# Patient Record
Sex: MALE | Race: WHITE | NOT HISPANIC OR LATINO | Employment: OTHER | URBAN - NONMETROPOLITAN AREA
[De-identification: names, ages, dates, MRNs, and addresses within clinical notes are randomized per-mention and may not be internally consistent; named-entity substitution may affect disease eponyms.]

---

## 2017-06-02 DIAGNOSIS — Z12.5 SCREENING FOR MALIGNANT NEOPLASM OF PROSTATE: ICD-10-CM

## 2017-06-02 DIAGNOSIS — E78.5 HYPERLIPIDEMIA, UNSPECIFIED HYPERLIPIDEMIA TYPE: Primary | ICD-10-CM

## 2017-06-02 LAB
ALBUMIN SERPL-MCNC: 4.1 G/DL (ref 3.2–5.5)
ALBUMIN/GLOB SERPL: 1.4 G/DL (ref 1–3)
ALP SERPL-CCNC: 69 U/L (ref 15–121)
ALT SERPL W P-5'-P-CCNC: 14 U/L (ref 10–60)
ANION GAP SERPL CALCULATED.3IONS-SCNC: 10 MMOL/L (ref 5–15)
AST SERPL-CCNC: 18 U/L (ref 10–60)
BASOPHILS # BLD AUTO: 0.04 10*3/MM3 (ref 0–0.2)
BASOPHILS NFR BLD AUTO: 0.7 % (ref 0–2)
BILIRUB SERPL-MCNC: 1.2 MG/DL (ref 0.2–1)
BUN BLD-MCNC: 24 MG/DL (ref 8–25)
BUN/CREAT SERPL: 30 (ref 7–25)
CALCIUM SPEC-SCNC: 9.2 MG/DL (ref 8.4–10.8)
CHLORIDE SERPL-SCNC: 107 MMOL/L (ref 100–112)
CHOLEST SERPL-MCNC: 211 MG/DL (ref 150–200)
CO2 SERPL-SCNC: 25 MMOL/L (ref 20–32)
CREAT BLD-MCNC: 0.8 MG/DL (ref 0.4–1.3)
DEPRECATED RDW RBC AUTO: 42.1 FL (ref 35.1–43.9)
EOSINOPHIL # BLD AUTO: 0.18 10*3/MM3 (ref 0–0.7)
EOSINOPHIL NFR BLD AUTO: 3.2 % (ref 0–7)
ERYTHROCYTE [DISTWIDTH] IN BLOOD BY AUTOMATED COUNT: 13.1 % (ref 11.5–14.5)
GFR SERPL CREATININE-BSD FRML MDRD: 96 ML/MIN/1.73 (ref 49–113)
GLOBULIN UR ELPH-MCNC: 2.9 GM/DL (ref 2.5–4.6)
GLUCOSE BLD-MCNC: 103 MG/DL (ref 70–100)
HCT VFR BLD AUTO: 45 % (ref 39–49)
HDLC SERPL-MCNC: 64 MG/DL (ref 35–100)
HGB BLD-MCNC: 15.5 G/DL (ref 13.7–17.3)
LDLC SERPL CALC-MCNC: 132 MG/DL
LDLC/HDLC SERPL: 2.07 {RATIO}
LYMPHOCYTES # BLD AUTO: 1.57 10*3/MM3 (ref 0.6–4.2)
LYMPHOCYTES NFR BLD AUTO: 27.6 % (ref 10–50)
MCH RBC QN AUTO: 30.8 PG (ref 26.5–34)
MCHC RBC AUTO-ENTMCNC: 34.4 G/DL (ref 31.5–36.3)
MCV RBC AUTO: 89.3 FL (ref 80–98)
MONOCYTES # BLD AUTO: 0.47 10*3/MM3 (ref 0–0.9)
MONOCYTES NFR BLD AUTO: 8.3 % (ref 0–12)
NEUTROPHILS # BLD AUTO: 3.43 10*3/MM3 (ref 2–8.6)
NEUTROPHILS NFR BLD AUTO: 60.2 % (ref 37–80)
PLATELET # BLD AUTO: 268 10*3/MM3 (ref 150–450)
PMV BLD AUTO: 9.1 FL (ref 8–12)
POTASSIUM BLD-SCNC: 4 MMOL/L (ref 3.4–5.4)
PROT SERPL-MCNC: 7 G/DL (ref 6.7–8.2)
PSA SERPL-MCNC: 4.4 NG/ML (ref 0–4)
RBC # BLD AUTO: 5.04 10*6/MM3 (ref 4.37–5.74)
SODIUM BLD-SCNC: 142 MMOL/L (ref 134–146)
TRIGL SERPL-MCNC: 73 MG/DL (ref 35–160)
TSH SERPL DL<=0.05 MIU/L-ACNC: 0.92 MIU/ML (ref 0.46–4.68)
VLDLC SERPL-MCNC: 14.6 MG/DL
WBC NRBC COR # BLD: 5.69 10*3/MM3 (ref 3.2–9.8)

## 2017-06-02 PROCEDURE — 84153 ASSAY OF PSA TOTAL: CPT | Performed by: INTERNAL MEDICINE

## 2017-06-02 PROCEDURE — 84443 ASSAY THYROID STIM HORMONE: CPT | Performed by: INTERNAL MEDICINE

## 2017-06-02 PROCEDURE — 36415 COLL VENOUS BLD VENIPUNCTURE: CPT | Performed by: INTERNAL MEDICINE

## 2017-06-02 PROCEDURE — 80053 COMPREHEN METABOLIC PANEL: CPT | Performed by: INTERNAL MEDICINE

## 2017-06-02 PROCEDURE — 80061 LIPID PANEL: CPT | Performed by: INTERNAL MEDICINE

## 2017-06-02 PROCEDURE — 85025 COMPLETE CBC W/AUTO DIFF WBC: CPT | Performed by: INTERNAL MEDICINE

## 2017-06-09 ENCOUNTER — OFFICE VISIT (OUTPATIENT)
Dept: FAMILY MEDICINE CLINIC | Facility: CLINIC | Age: 67
End: 2017-06-09

## 2017-06-09 VITALS
WEIGHT: 192 LBS | SYSTOLIC BLOOD PRESSURE: 130 MMHG | HEIGHT: 73 IN | HEART RATE: 60 BPM | TEMPERATURE: 98.2 F | BODY MASS INDEX: 25.45 KG/M2 | DIASTOLIC BLOOD PRESSURE: 80 MMHG

## 2017-06-09 DIAGNOSIS — E78.01 FAMILIAL HYPERCHOLESTEROLEMIA: Primary | Chronic | ICD-10-CM

## 2017-06-09 DIAGNOSIS — R97.20 ELEVATED PROSTATE SPECIFIC ANTIGEN (PSA): Chronic | ICD-10-CM

## 2017-06-09 DIAGNOSIS — Z12.5 SCREENING FOR MALIGNANT NEOPLASM OF PROSTATE: Chronic | ICD-10-CM

## 2017-06-09 DIAGNOSIS — M75.101 ROTATOR CUFF SYNDROME OF RIGHT SHOULDER: ICD-10-CM

## 2017-06-09 PROCEDURE — 99214 OFFICE O/P EST MOD 30 MIN: CPT | Performed by: INTERNAL MEDICINE

## 2017-06-09 RX ORDER — ACETAMINOPHEN,DIPHENHYDRAMINE HCL 500; 25 MG/1; MG/1
1 TABLET, FILM COATED ORAL NIGHTLY PRN
COMMUNITY
End: 2017-06-09

## 2017-06-09 NOTE — PROGRESS NOTES
Subjective     History of Present Illness     Arias Rodriguez is a 66 y.o. male here for overdue annual follow up and review of labs.  His medical issues include hyperlipidemia, osteoarthritis, and diverticular disease.  He continues to take Naproxen for flares of osteoarthritic pain, especially in the right knee.       Dr. Isaac is addressing right rotator cuff injury and recommended a second opinion by Dr. Lira, orthopedist regarding surgical repair.  He injured his shoulder in a fall in March.   He was asking about other available orthopedist.  I gave him the names of Dr. Clinton and Dr. Acevedo.  He has taken a Tylenol PM at night to help him sleep due to the shoulder pain.  I recommended he stop the Tylenol PM once his shoulder pain is resolved.       GERD symptoms are adequately controlled.      He has a history of enlarged prostate.  He denies significant BPH symptoms currently.   PSA steady at 4.4.      Weight is stable.     The patient's relevant past medical, surgical, and social history was reviewed in Epic.  Lab results are reviewed with the patient today.  CBC unremarkable. Fasting glucose 103.  Liver and renal function normal.  Thyroid at goal.  Total cholesterol improved at 211.  HDL 64 and .  Triglycerides 73.  PSA is steady at 4.4.       Review of Systems   Constitutional: Negative for chills, fatigue and fever.   HENT: Negative for congestion, ear pain, postnasal drip, sinus pressure and sore throat.    Respiratory: Negative for cough, shortness of breath and wheezing.    Cardiovascular: Negative for chest pain, palpitations and leg swelling.   Gastrointestinal: Negative for abdominal pain, blood in stool, constipation, diarrhea, nausea and vomiting.   Endocrine: Negative for cold intolerance, heat intolerance, polydipsia and polyuria.   Genitourinary: Negative for dysuria, frequency, hematuria and urgency.   Skin: Negative for rash.   Neurological: Negative for syncope and  "weakness.      PHQ-9 Depression Screening 6/9/2017   Little interest or pleasure in doing things 0   Feeling down, depressed, or hopeless 0   Trouble falling or staying asleep, or sleeping too much 0   Feeling tired or having little energy 1   Poor appetite or overeating 0   Feeling bad about yourself - or that you are a failure or have let yourself or your family down 0   Trouble concentrating on things, such as reading the newspaper or watching television 0   Moving or speaking so slowly that other people could have noticed. Or the opposite - being so fidgety or restless that you have been moving around a lot more than usual 0   Thoughts that you would be better off dead, or of hurting yourself in some way 0   PHQ-9 Total Score 1   If you checked off any problems, how difficult have these problems made it for you to do your work, take care of things at home, or get along with other people? Somewhat difficult         Objective     Vitals:    06/09/17 0953   BP: 130/80   Pulse: 60   Temp: 98.2 °F (36.8 °C)   TempSrc: Oral   Weight: 192 lb (87.1 kg)   Height: 73\" (185.4 cm)       Physical Exam   Constitutional: He is oriented to person, place, and time. He appears well-developed and well-nourished. No distress.   HENT:   Head: Normocephalic and atraumatic.   Nose: Right sinus exhibits no maxillary sinus tenderness and no frontal sinus tenderness. Left sinus exhibits no maxillary sinus tenderness and no frontal sinus tenderness.   Mouth/Throat: Uvula is midline, oropharynx is clear and moist and mucous membranes are normal. No oral lesions. No tonsillar exudate.   Eyes: Conjunctivae and EOM are normal. Pupils are equal, round, and reactive to light.   Neck: Trachea normal. Neck supple. No JVD present. Carotid bruit is not present. No tracheal deviation present. No thyroid mass and no thyromegaly present.   Cardiovascular: Normal rate, regular rhythm and normal heart sounds.   No extrasystoles are present. PMI is not " displaced.    No murmur heard.  Pulmonary/Chest: Effort normal and breath sounds normal. No accessory muscle usage. No respiratory distress. He has no decreased breath sounds. He has no wheezes. He has no rhonchi. He has no rales.   Abdominal: Soft. Bowel sounds are normal. He exhibits no distension. There is no hepatosplenomegaly. There is no tenderness.       Vascular Status -  His exam exhibits right foot vasculature normal. His exam exhibits no right foot edema. His exam exhibits left foot vasculature normal. His exam exhibits no left foot edema.  Lymphadenopathy:     He has no cervical adenopathy.   Neurological: He is alert and oriented to person, place, and time. No cranial nerve deficit. Coordination normal.   Skin: Skin is warm, dry and intact. No rash noted. No cyanosis. Nails show no clubbing.   Psychiatric: He has a normal mood and affect. His speech is normal and behavior is normal. Thought content normal.   Vitals reviewed.    Future Appointments  Date Time Provider Department Center   6/11/2018 10:00 AM Peyman Denney MD [facility] None       Assessment/Plan      Keep his appointment with Dr. Lira for the right rotator cuff injury, As arranged by Dr. Isaac.  He will notify us if he wants a referral to another orthopedist.  I gave him names of Dr. Acevedo and Dr. Martinez today when he asked who I normally recommend for shoulder surgeries.         PSA is remaining stable and he reports no significant BPH symptoms.  We will repeat a prostate exam next year.    Continue dietary efforts to control cholesterol.  His ratio is quite reasonable.    Continue to follow the PSA.  This year's results are still only approximately 50% of the elevated PSA a couple years ago.    Scribed for Dr. Denney by Edda Costello [facility].      Diagnoses and all orders for this visit:    Familial hypercholesterolemia  -     CBC Auto Differential; Future  -     Comprehensive Metabolic Panel; Future  -     Lipid Panel;  Future    Elevated prostate specific antigen (PSA)    Rotator cuff syndrome of right shoulder    Screening for malignant neoplasm of prostate  -     PSA Screen; Future    Other orders  -     Discontinue: diphenhydrAMINE-acetaminophen (TYLENOL PM)  MG tablet per tablet; Take 1 tablet by mouth At Night As Needed for Sleep.      Orders Only on 06/02/2017   Component Date Value Ref Range Status   • Glucose 06/02/2017 103* 70 - 100 mg/dL Final   • BUN 06/02/2017 24  8 - 25 mg/dL Final   • Creatinine 06/02/2017 0.80  0.40 - 1.30 mg/dL Final   • Sodium 06/02/2017 142  134 - 146 mmol/L Final   • Potassium 06/02/2017 4.0  3.4 - 5.4 mmol/L Final   • Chloride 06/02/2017 107  100 - 112 mmol/L Final   • CO2 06/02/2017 25.0  20.0 - 32.0 mmol/L Final   • Calcium 06/02/2017 9.2  8.4 - 10.8 mg/dL Final   • Total Protein 06/02/2017 7.0  6.7 - 8.2 g/dL Final   • Albumin 06/02/2017 4.10  3.20 - 5.50 g/dL Final   • ALT (SGPT) 06/02/2017 14  10 - 60 U/L Final   • AST (SGOT) 06/02/2017 18  10 - 60 U/L Final   • Alkaline Phosphatase 06/02/2017 69  15 - 121 U/L Final   • Total Bilirubin 06/02/2017 1.2* 0.2 - 1.0 mg/dL Final   • eGFR Non African Amer 06/02/2017 96  49 - 113 mL/min/1.73 Final   • Globulin 06/02/2017 2.9  2.5 - 4.6 gm/dL Final   • A/G Ratio 06/02/2017 1.4  1.0 - 3.0 g/dL Final   • BUN/Creatinine Ratio 06/02/2017 30.0* 7.0 - 25.0 Final   • Anion Gap 06/02/2017 10.0  5.0 - 15.0 mmol/L Final   • Total Cholesterol 06/02/2017 211* 150 - 200 mg/dL Final   • Triglycerides 06/02/2017 73  35 - 160 mg/dL Final   • HDL Cholesterol 06/02/2017 64  35 - 100 mg/dL Final   • LDL Cholesterol  06/02/2017 132  mg/dL Final   • VLDL Cholesterol 06/02/2017 14.6  mg/dL Final   • LDL/HDL Ratio 06/02/2017 2.07   Final   • TSH 06/02/2017 0.920  0.460 - 4.680 mIU/mL Final   • PSA 06/02/2017 4.400* 0.000 - 4.000 ng/mL Final   • WBC 06/02/2017 5.69  3.20 - 9.80 10*3/mm3 Final   • RBC 06/02/2017 5.04  4.37 - 5.74 10*6/mm3 Final   • Hemoglobin  06/02/2017 15.5  13.7 - 17.3 g/dL Final   • Hematocrit 06/02/2017 45.0  39.0 - 49.0 % Final   • MCV 06/02/2017 89.3  80.0 - 98.0 fL Final   • MCH 06/02/2017 30.8  26.5 - 34.0 pg Final   • MCHC 06/02/2017 34.4  31.5 - 36.3 g/dL Final   • RDW 06/02/2017 13.1  11.5 - 14.5 % Final   • RDW-SD 06/02/2017 42.1  35.1 - 43.9 fl Final   • MPV 06/02/2017 9.1  8.0 - 12.0 fL Final   • Platelets 06/02/2017 268  150 - 450 10*3/mm3 Final   • Neutrophil % 06/02/2017 60.2  37.0 - 80.0 % Final   • Lymphocyte % 06/02/2017 27.6  10.0 - 50.0 % Final   • Monocyte % 06/02/2017 8.3  0.0 - 12.0 % Final   • Eosinophil % 06/02/2017 3.2  0.0 - 7.0 % Final   • Basophil % 06/02/2017 0.7  0.0 - 2.0 % Final   • Neutrophils, Absolute 06/02/2017 3.43  2.00 - 8.60 10*3/mm3 Final   • Lymphocytes, Absolute 06/02/2017 1.57  0.60 - 4.20 10*3/mm3 Final   • Monocytes, Absolute 06/02/2017 0.47  0.00 - 0.90 10*3/mm3 Final   • Eosinophils, Absolute 06/02/2017 0.18  0.00 - 0.70 10*3/mm3 Final   • Basophils, Absolute 06/02/2017 0.04  0.00 - 0.20 10*3/mm3 Final   ]

## 2017-06-15 RX ORDER — NAPROXEN 375 MG/1
375 TABLET ORAL 2 TIMES DAILY WITH MEALS
Qty: 60 TABLET | Refills: 5 | Status: SHIPPED | OUTPATIENT
Start: 2017-06-15 | End: 2018-06-11 | Stop reason: SDUPTHER

## 2017-06-21 DIAGNOSIS — S46.009A ROTATOR CUFF INJURY, UNSPECIFIED LATERALITY, INITIAL ENCOUNTER: Primary | ICD-10-CM

## 2018-06-04 ENCOUNTER — LAB (OUTPATIENT)
Dept: LAB | Facility: OTHER | Age: 68
End: 2018-06-04

## 2018-06-04 DIAGNOSIS — Z12.5 SCREENING FOR MALIGNANT NEOPLASM OF PROSTATE: Chronic | ICD-10-CM

## 2018-06-04 DIAGNOSIS — E78.01 FAMILIAL HYPERCHOLESTEROLEMIA: Chronic | ICD-10-CM

## 2018-06-04 LAB
ALBUMIN SERPL-MCNC: 4.1 G/DL (ref 3.2–5.5)
ALBUMIN/GLOB SERPL: 1.4 G/DL (ref 1–3)
ALP SERPL-CCNC: 74 U/L (ref 15–121)
ALT SERPL W P-5'-P-CCNC: 14 U/L (ref 10–60)
ANION GAP SERPL CALCULATED.3IONS-SCNC: 9 MMOL/L (ref 5–15)
AST SERPL-CCNC: 19 U/L (ref 10–60)
BASOPHILS # BLD AUTO: 0.03 10*3/MM3 (ref 0–0.2)
BASOPHILS NFR BLD AUTO: 0.5 % (ref 0–2)
BILIRUB SERPL-MCNC: 1 MG/DL (ref 0.2–1)
BUN BLD-MCNC: 24 MG/DL (ref 8–25)
BUN/CREAT SERPL: 26.7 (ref 7–25)
CALCIUM SPEC-SCNC: 9.2 MG/DL (ref 8.4–10.8)
CHLORIDE SERPL-SCNC: 102 MMOL/L (ref 100–112)
CHOLEST SERPL-MCNC: 220 MG/DL (ref 150–200)
CO2 SERPL-SCNC: 27 MMOL/L (ref 20–32)
CREAT BLD-MCNC: 0.9 MG/DL (ref 0.4–1.3)
DEPRECATED RDW RBC AUTO: 43.3 FL (ref 35.1–43.9)
EOSINOPHIL # BLD AUTO: 0.21 10*3/MM3 (ref 0–0.7)
EOSINOPHIL NFR BLD AUTO: 3.2 % (ref 0–7)
ERYTHROCYTE [DISTWIDTH] IN BLOOD BY AUTOMATED COUNT: 13.2 % (ref 11.5–14.5)
GFR SERPL CREATININE-BSD FRML MDRD: 84 ML/MIN/1.73 (ref 49–113)
GLOBULIN UR ELPH-MCNC: 2.9 GM/DL (ref 2.5–4.6)
GLUCOSE BLD-MCNC: 105 MG/DL (ref 70–100)
HCT VFR BLD AUTO: 47.7 % (ref 39–49)
HDLC SERPL-MCNC: 66 MG/DL (ref 35–100)
HGB BLD-MCNC: 15.9 G/DL (ref 13.7–17.3)
LDLC SERPL CALC-MCNC: 140 MG/DL
LDLC/HDLC SERPL: 2.12 {RATIO}
LYMPHOCYTES # BLD AUTO: 1.71 10*3/MM3 (ref 0.6–4.2)
LYMPHOCYTES NFR BLD AUTO: 26.4 % (ref 10–50)
MCH RBC QN AUTO: 30.8 PG (ref 26.5–34)
MCHC RBC AUTO-ENTMCNC: 33.3 G/DL (ref 31.5–36.3)
MCV RBC AUTO: 92.4 FL (ref 80–98)
MONOCYTES # BLD AUTO: 0.49 10*3/MM3 (ref 0–0.9)
MONOCYTES NFR BLD AUTO: 7.6 % (ref 0–12)
NEUTROPHILS # BLD AUTO: 4.04 10*3/MM3 (ref 2–8.6)
NEUTROPHILS NFR BLD AUTO: 62.3 % (ref 37–80)
PLATELET # BLD AUTO: 274 10*3/MM3 (ref 150–450)
PMV BLD AUTO: 8.9 FL (ref 8–12)
POTASSIUM BLD-SCNC: 3.9 MMOL/L (ref 3.4–5.4)
PROT SERPL-MCNC: 7 G/DL (ref 6.7–8.2)
PSA SERPL-MCNC: 3.94 NG/ML (ref 0–4)
RBC # BLD AUTO: 5.16 10*6/MM3 (ref 4.37–5.74)
SODIUM BLD-SCNC: 138 MMOL/L (ref 134–146)
TRIGL SERPL-MCNC: 70 MG/DL (ref 35–160)
VLDLC SERPL-MCNC: 14 MG/DL
WBC NRBC COR # BLD: 6.48 10*3/MM3 (ref 3.2–9.8)

## 2018-06-04 PROCEDURE — 36415 COLL VENOUS BLD VENIPUNCTURE: CPT | Performed by: INTERNAL MEDICINE

## 2018-06-04 PROCEDURE — 80061 LIPID PANEL: CPT | Performed by: INTERNAL MEDICINE

## 2018-06-04 PROCEDURE — G0103 PSA SCREENING: HCPCS | Performed by: INTERNAL MEDICINE

## 2018-06-04 PROCEDURE — 80053 COMPREHEN METABOLIC PANEL: CPT | Performed by: INTERNAL MEDICINE

## 2018-06-04 PROCEDURE — 85025 COMPLETE CBC W/AUTO DIFF WBC: CPT | Performed by: INTERNAL MEDICINE

## 2018-06-11 ENCOUNTER — OFFICE VISIT (OUTPATIENT)
Dept: FAMILY MEDICINE CLINIC | Facility: CLINIC | Age: 68
End: 2018-06-11

## 2018-06-11 VITALS
SYSTOLIC BLOOD PRESSURE: 134 MMHG | DIASTOLIC BLOOD PRESSURE: 70 MMHG | TEMPERATURE: 98.9 F | BODY MASS INDEX: 24.49 KG/M2 | HEIGHT: 73 IN | HEART RATE: 64 BPM | WEIGHT: 184.8 LBS

## 2018-06-11 DIAGNOSIS — E78.2 MIXED HYPERLIPIDEMIA: Primary | Chronic | ICD-10-CM

## 2018-06-11 DIAGNOSIS — R97.20 ELEVATED PROSTATE SPECIFIC ANTIGEN (PSA): Chronic | ICD-10-CM

## 2018-06-11 DIAGNOSIS — K57.30 DIVERTICULAR DISEASE OF COLON: Chronic | ICD-10-CM

## 2018-06-11 DIAGNOSIS — Z12.5 SCREENING FOR MALIGNANT NEOPLASM OF PROSTATE: Chronic | ICD-10-CM

## 2018-06-11 DIAGNOSIS — M75.101 ROTATOR CUFF SYNDROME, RIGHT: Chronic | ICD-10-CM

## 2018-06-11 DIAGNOSIS — R63.4 UNEXPLAINED WEIGHT LOSS: Chronic | ICD-10-CM

## 2018-06-11 DIAGNOSIS — L72.9 SUBCUTANEOUS CYST: ICD-10-CM

## 2018-06-11 PROCEDURE — 99214 OFFICE O/P EST MOD 30 MIN: CPT | Performed by: INTERNAL MEDICINE

## 2018-06-11 RX ORDER — NAPROXEN 375 MG/1
375 TABLET ORAL 2 TIMES DAILY WITH MEALS
Qty: 60 TABLET | Refills: 11 | Status: SHIPPED | OUTPATIENT
Start: 2018-06-11 | End: 2019-06-13 | Stop reason: SDUPTHER

## 2018-06-11 NOTE — PROGRESS NOTES
Subjective        History of Present Illness     Arias Rodriguez is a 67 y.o. male annual follow up and review of labs.  His medical issues include hyperlipidemia, osteoarthritis, and diverticular disease. He continues to take Naproxen for flares of osteoarthritic pain, especially in the right knee.   He has a history of enlarged prostate and we continue to follow PSA. PSA improved at 3.94.    Dr. Clinton addressed his right rotator cuff injury after he injured his shoulder in a fall in March 2017. He did not recommend surgical repair, but recommended physical therapy.  He continues with home exercises demonstrated at physical therapy, but does continue to have some decreased range of motion and discomfort, for which he takes Naprosyn.    He reports experiencing drainage and cough in the fall months.  I told him to notify me if he is experiencing the symptoms next fall and we will discuss a plan.      He has a subcutaneous cyst on his back.  He was treated for infected inclusion cyst with Bactrim DS at Urgent Care.  Dr. García evaluated the cyst and scheduled an appointment to recheck in November.       His colonoscopy will be due in June 2020 due to poor prep.             Weight is down 8 pounds in the past year.  Blood pressure at goal.   I asked him to monitor weight and notify me if he continues to have persistent weight loss.    The patient's relevant past medical, surgical, and social history was reviewed in Epic.   Lab results are reviewed with the patient today.  CBC unremarkable.  Renal and liver function normal.  Total cholesterol 220.  HDL 66.  .  Triglycerides 70 with ratio 2.12.  PSA 3.94    Review of Systems   Constitutional: Negative for chills, fatigue and fever.   HENT: Negative for congestion, ear pain, postnasal drip, sinus pressure and sore throat.    Respiratory: Negative for cough, shortness of breath and wheezing.    Cardiovascular: Negative for chest pain, palpitations and leg  "swelling.   Gastrointestinal: Negative for abdominal pain, blood in stool, constipation, diarrhea, nausea and vomiting.   Endocrine: Negative for cold intolerance, heat intolerance, polydipsia and polyuria.   Genitourinary: Negative for dysuria, frequency, hematuria and urgency.   Skin: Negative for rash.   Neurological: Negative for syncope and weakness.        Objective     Vitals:    06/11/18 1017   BP: 134/70   Pulse: 64   Temp: 98.9 °F (37.2 °C)   TempSrc: Oral   Weight: 83.8 kg (184 lb 12.8 oz)   Height: 185.4 cm (73\")     Physical Exam   Constitutional: He is oriented to person, place, and time. He appears well-developed and well-nourished. No distress.   HENT:   Head: Normocephalic and atraumatic.   Nose: Right sinus exhibits no maxillary sinus tenderness and no frontal sinus tenderness. Left sinus exhibits no maxillary sinus tenderness and no frontal sinus tenderness.   Mouth/Throat: Uvula is midline, oropharynx is clear and moist and mucous membranes are normal. No oral lesions. No tonsillar exudate.   Eyes: Conjunctivae and EOM are normal. Pupils are equal, round, and reactive to light.   Neck: Trachea normal. Neck supple. No JVD present. Carotid bruit is not present. No tracheal deviation present. No thyroid mass and no thyromegaly present.   Cardiovascular: Normal rate, regular rhythm, normal heart sounds and intact distal pulses.   No extrasystoles are present. PMI is not displaced.    No murmur heard.  Pulmonary/Chest: Effort normal and breath sounds normal. No accessory muscle usage. No respiratory distress. He has no decreased breath sounds. He has no wheezes. He has no rhonchi. He has no rales.   Abdominal: Soft. Bowel sounds are normal. He exhibits no distension. There is no hepatosplenomegaly. There is no tenderness.     Vascular Status -  His right foot exhibits normal foot vasculature  and no edema. His left foot exhibits normal foot vasculature  and no edema.  Lymphadenopathy:     He has no " cervical adenopathy.   Neurological: He is alert and oriented to person, place, and time. No cranial nerve deficit. Coordination normal.   Skin: Skin is warm, dry and intact. No rash noted. No cyanosis. Nails show no clubbing.   Subcutaneous cyst on the back.    Psychiatric: He has a normal mood and affect. His speech is normal and behavior is normal. Judgment and thought content normal.   Vitals reviewed.          Assessment/Plan      Continue with the Naprosyn for the rotator cuff syndrome.     Monitor weight and notify me if he continues to have persistent weight loss.      PSA is improved. We continue to monitor PSA.     Continue dietary efforts to control cholesterol.  His ratio is quite reasonable.    Follow with Dr. García to evaluate the subcutaneous cyst.    Return in one year for annual exam with fasting labs one week prior.  He will notify me in the fall months if he is experiencing a flare of allergy symptoms.      Scribed for Dr. Denney by Edda Costello [facility].     Diagnoses and all orders for this visit:    Mixed hyperlipidemia  -     CBC Auto Differential; Future  -     Comprehensive Metabolic Panel; Future  -     Lipid Panel; Future    Diverticular disease of colon    Rotator cuff syndrome, right    Screening for malignant neoplasm of prostate  -     PSA Screen; Future    Elevated prostate specific antigen (PSA)    Unexplained weight loss    Subcutaneous cyst    Other orders  -     naproxen (NAPROSYN) 375 MG tablet; Take 1 tablet by mouth 2 (Two) Times a Day With Meals.        Lab on 06/04/2018   Component Date Value Ref Range Status   • WBC 06/04/2018 6.48  3.20 - 9.80 10*3/mm3 Final   • RBC 06/04/2018 5.16  4.37 - 5.74 10*6/mm3 Final   • Hemoglobin 06/04/2018 15.9  13.7 - 17.3 g/dL Final   • Hematocrit 06/04/2018 47.7  39.0 - 49.0 % Final   • MCV 06/04/2018 92.4  80.0 - 98.0 fL Final   • MCH 06/04/2018 30.8  26.5 - 34.0 pg Final   • MCHC 06/04/2018 33.3  31.5 - 36.3 g/dL Final   • RDW 06/04/2018  13.2  11.5 - 14.5 % Final   • RDW-SD 06/04/2018 43.3  35.1 - 43.9 fl Final   • MPV 06/04/2018 8.9  8.0 - 12.0 fL Final   • Platelets 06/04/2018 274  150 - 450 10*3/mm3 Final   • Neutrophil % 06/04/2018 62.3  37.0 - 80.0 % Final   • Lymphocyte % 06/04/2018 26.4  10.0 - 50.0 % Final   • Monocyte % 06/04/2018 7.6  0.0 - 12.0 % Final   • Eosinophil % 06/04/2018 3.2  0.0 - 7.0 % Final   • Basophil % 06/04/2018 0.5  0.0 - 2.0 % Final   • Neutrophils, Absolute 06/04/2018 4.04  2.00 - 8.60 10*3/mm3 Final   • Lymphocytes, Absolute 06/04/2018 1.71  0.60 - 4.20 10*3/mm3 Final   • Monocytes, Absolute 06/04/2018 0.49  0.00 - 0.90 10*3/mm3 Final   • Eosinophils, Absolute 06/04/2018 0.21  0.00 - 0.70 10*3/mm3 Final   • Basophils, Absolute 06/04/2018 0.03  0.00 - 0.20 10*3/mm3 Final   • Glucose 06/04/2018 105* 70 - 100 mg/dL Final   • BUN 06/04/2018 24  8 - 25 mg/dL Final   • Creatinine 06/04/2018 0.90  0.40 - 1.30 mg/dL Final   • Sodium 06/04/2018 138  134 - 146 mmol/L Final   • Potassium 06/04/2018 3.9  3.4 - 5.4 mmol/L Final   • Chloride 06/04/2018 102  100 - 112 mmol/L Final   • CO2 06/04/2018 27.0  20.0 - 32.0 mmol/L Final   • Calcium 06/04/2018 9.2  8.4 - 10.8 mg/dL Final   • Total Protein 06/04/2018 7.0  6.7 - 8.2 g/dL Final   • Albumin 06/04/2018 4.10  3.20 - 5.50 g/dL Final   • ALT (SGPT) 06/04/2018 14  10 - 60 U/L Final   • AST (SGOT) 06/04/2018 19  10 - 60 U/L Final   • Alkaline Phosphatase 06/04/2018 74  15 - 121 U/L Final   • Total Bilirubin 06/04/2018 1.0  0.2 - 1.0 mg/dL Final   • eGFR Non African Amer 06/04/2018 84  49 - 113 mL/min/1.73 Final   • Globulin 06/04/2018 2.9  2.5 - 4.6 gm/dL Final   • A/G Ratio 06/04/2018 1.4  1.0 - 3.0 g/dL Final   • BUN/Creatinine Ratio 06/04/2018 26.7* 7.0 - 25.0 Final   • Anion Gap 06/04/2018 9.0  5.0 - 15.0 mmol/L Final   • Total Cholesterol 06/04/2018 220* 150 - 200 mg/dL Final   • Triglycerides 06/04/2018 70  35 - 160 mg/dL Final   • HDL Cholesterol 06/04/2018 66  35 - 100 mg/dL  Final   • LDL Cholesterol  06/04/2018 140  mg/dL Final   • VLDL Cholesterol 06/04/2018 14  mg/dL Final   • LDL/HDL Ratio 06/04/2018 2.12   Final   • PSA 06/04/2018 3.940  0.000 - 4.000 ng/mL Final   ]

## 2018-07-10 DIAGNOSIS — R30.0 DYSURIA: Primary | ICD-10-CM

## 2018-07-13 ENCOUNTER — OFFICE VISIT (OUTPATIENT)
Dept: FAMILY MEDICINE CLINIC | Facility: CLINIC | Age: 68
End: 2018-07-13

## 2018-07-13 VITALS
TEMPERATURE: 99 F | SYSTOLIC BLOOD PRESSURE: 130 MMHG | BODY MASS INDEX: 24.25 KG/M2 | WEIGHT: 183 LBS | HEIGHT: 73 IN | HEART RATE: 62 BPM | DIASTOLIC BLOOD PRESSURE: 70 MMHG

## 2018-07-13 DIAGNOSIS — N30.00 ACUTE CYSTITIS WITHOUT HEMATURIA: Primary | ICD-10-CM

## 2018-07-13 DIAGNOSIS — R63.4 UNEXPLAINED WEIGHT LOSS: Chronic | ICD-10-CM

## 2018-07-13 LAB
BACTERIA UR QL AUTO: ABNORMAL /HPF
BILIRUB UR QL STRIP: NEGATIVE
CLARITY UR: CLEAR
COLOR UR: YELLOW
GLUCOSE UR STRIP-MCNC: NEGATIVE MG/DL
HGB UR QL STRIP.AUTO: ABNORMAL
HYALINE CASTS UR QL AUTO: ABNORMAL /LPF
KETONES UR QL STRIP: NEGATIVE
LEUKOCYTE ESTERASE UR QL STRIP.AUTO: NEGATIVE
NITRITE UR QL STRIP: NEGATIVE
PH UR STRIP.AUTO: 6 [PH] (ref 5.5–8)
PROT UR QL STRIP: NEGATIVE
RBC # UR: ABNORMAL /HPF
REF LAB TEST METHOD: ABNORMAL
SP GR UR STRIP: 1.02 (ref 1–1.03)
SQUAMOUS #/AREA URNS HPF: ABNORMAL /HPF
UROBILINOGEN UR QL STRIP: ABNORMAL
WBC UR QL AUTO: ABNORMAL /HPF

## 2018-07-13 PROCEDURE — 81001 URINALYSIS AUTO W/SCOPE: CPT | Performed by: INTERNAL MEDICINE

## 2018-07-13 PROCEDURE — 99213 OFFICE O/P EST LOW 20 MIN: CPT | Performed by: INTERNAL MEDICINE

## 2018-07-13 NOTE — PROGRESS NOTES
Subjective     History of Present Illness     Arias Rodriguez is a 67 y.o. male with medical issues including hyperlipidemia, osteoarthritis, and diverticular disease who comes in today for follow up on UTI.  He initially experienced fever and chills a couple of weeks ago.  He was seen at Urgent Care on July 4th with acute cystitis and microscopic hematuria treated with Pyridium t.i.d. X 2 days and 10-day course of Bactrim DS.  He has three tablets of Bactrim remaining, but states symptoms are gradually improving.  He continues to have some urinary frequency.  Fevers and chills have resolved.  No gross hematuria.  Repeat urinalysis this morning repeats continued clearing.  He has had some mild GI upset with the Bactrim.  He can stop the antibiotic today if he experiences significant GI symptomns.         With his visit last month, I was concerned about 8 pounds of weight loss without trying.  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  I'm pleased to see that his weight is up 3 pounds this month, despite the recent UTI.  He reports his appetite was decreased for a week prior to getting started on the antibiotic.       Review of Systems   Constitutional: Negative for chills, fatigue and fever.   HENT: Negative for congestion, ear pain, postnasal drip, sinus pressure and sore throat.    Respiratory: Negative for cough, shortness of breath and wheezing.    Cardiovascular: Negative for chest pain, palpitations and leg swelling.   Gastrointestinal: Negative for abdominal pain, blood in stool, constipation, diarrhea, nausea and vomiting.   Endocrine: Negative for cold intolerance, heat intolerance, polydipsia and polyuria.   Genitourinary: Positive for frequency. Negative for dysuria, hematuria and urgency.   Skin: Negative for rash.   Neurological: Negative for syncope and weakness.        Objective     Vitals:    07/13/18 0902   BP: 130/70   Pulse: 62   Temp: 99 °F (37.2 °C)  "  [facility]: Oral   Weight: 83 kg (183 lb)   Height: 185.4 cm (73\")     Physical Exam   Constitutional: He is oriented to person, place, and time. He appears well-developed and well-nourished. No distress.   HENT:   Head: Normocephalic and atraumatic.   Nose: Nose normal.   Mouth/Throat: Oropharynx is clear and moist. No oropharyngeal exudate.   Eyes: EOM are normal. Pupils are equal, round, and reactive to light.   Neck: Neck supple. No JVD present. No thyromegaly present.   Cardiovascular: Normal rate, regular rhythm and normal heart sounds.    Pulmonary/Chest: Effort normal and breath sounds normal. No accessory muscle usage. No respiratory distress. He has no wheezes. He has no rales.   Abdominal: Soft. Bowel sounds are normal. He exhibits no distension. There is no tenderness.   Musculoskeletal: He exhibits no edema.   Lymphadenopathy:     He has no cervical adenopathy.   Neurological: He is alert and oriented to person, place, and time. No cranial nerve deficit.   Psychiatric: He has a normal mood and affect. His speech is normal and behavior is normal. Judgment and thought content normal.     Future Appointments  Date Time Provider Department Center   6/13/2019 10:00 AM Peyman Denney MD [facility] None       Assessment/Plan      Repeat urinalysis demonstrates continued clearing of UTI.  Complete the Bactrim tomorrow.  If he has significant GI symptoms today, he can go ahead and stop the Bactrim.     Continue to monitor his weight weekly at home.  His body weight is excellent currently.  Notify me of any unexplained weight loss.      Return next summer for routine follow up with fasting labs or sooner if needed.     Scribed for Dr. Denney by Edda Costello [facility].     Diagnoses and all orders for this visit:    Acute cystitis without hematuria    Unexplained weight loss        Orders Only on 07/10/2018   Component Date Value Ref Range Status   • Color, UA 07/13/2018 Yellow  Yellow, Straw Final   • Appearance, " UA 07/13/2018 Clear  Clear Final   • pH, UA 07/13/2018 6.0  5.5 - 8.0 Final   • Specific Gravity, UA 07/13/2018 1.020  1.005 - 1.030 Final   • Glucose, UA 07/13/2018 Negative  Negative Final   • Ketones, UA 07/13/2018 Negative  Negative Final   • Bilirubin, UA 07/13/2018 Negative  Negative Final   • Blood, UA 07/13/2018 Trace* Negative Final   • Protein, UA 07/13/2018 Negative  Negative Final   • Leuk Esterase, UA 07/13/2018 Negative  Negative Final   • Nitrite, UA 07/13/2018 Negative  Negative Final   • Urobilinogen, UA 07/13/2018 0.2 E.U./dL  0.2 - 1.0 E.U./dL Final   • RBC, UA 07/13/2018 3-5* None Seen /HPF Final   • WBC, UA 07/13/2018 None Seen  None Seen /HPF Final   • Bacteria, UA 07/13/2018 Trace* None Seen /HPF Final   • Squamous Epithelial Cells, UA 07/13/2018 0-2  None Seen, 0-2 /HPF Final   • Hyaline Casts, UA 07/13/2018 0-2  None Seen /LPF Final   • Methodology 07/13/2018 Manual Light Microscopy   Final   Admission on 07/04/2018, Discharged on 07/04/2018   Component Date Value Ref Range Status   • Color 07/04/2018 Bella  Yellow, Straw, Dark Yellow, Bella Final   • Clarity, UA 07/04/2018 Clear  Clear Final   • Glucose, UA 07/04/2018 Trace* Negative, 1000 mg/dL (3+) mg/dL Final   • Bilirubin 07/04/2018 Small (1+)* Negative Final   • Ketones, UA 07/04/2018 Trace* Negative Final   • Specific Gravity  07/04/2018 1.020  1.005 - 1.030 Final   • Blood, UA 07/04/2018 2+* Negative Final   • pH, Urine 07/04/2018 6.0  5.0 - 8.0 Final   • Protein, POC 07/04/2018 1+* Negative mg/dL Final   • Urobilinogen, UA 07/04/2018 4 E.U./dL * Normal Final   • Leukocytes 07/04/2018 Negative  Negative Final   • Nitrite, UA 07/04/2018 Negative  Negative Final   ]

## 2019-06-06 ENCOUNTER — LAB (OUTPATIENT)
Dept: LAB | Facility: OTHER | Age: 69
End: 2019-06-06

## 2019-06-06 DIAGNOSIS — Z12.5 SCREENING FOR MALIGNANT NEOPLASM OF PROSTATE: Chronic | ICD-10-CM

## 2019-06-06 DIAGNOSIS — E78.2 MIXED HYPERLIPIDEMIA: Chronic | ICD-10-CM

## 2019-06-06 LAB
ALBUMIN SERPL-MCNC: 4.1 G/DL (ref 3.5–5)
ALBUMIN/GLOB SERPL: 1.3 G/DL (ref 1.1–1.8)
ALP SERPL-CCNC: 108 U/L (ref 38–126)
ALT SERPL W P-5'-P-CCNC: 15 U/L
ANION GAP SERPL CALCULATED.3IONS-SCNC: 9 MMOL/L (ref 5–15)
AST SERPL-CCNC: 19 U/L (ref 17–59)
BASOPHILS # BLD AUTO: 0.05 10*3/MM3 (ref 0–0.2)
BASOPHILS NFR BLD AUTO: 0.6 % (ref 0–1.5)
BILIRUB SERPL-MCNC: 0.9 MG/DL (ref 0.2–1.3)
BUN BLD-MCNC: 22 MG/DL (ref 7–23)
BUN/CREAT SERPL: 23.9 (ref 7–25)
CALCIUM SPEC-SCNC: 9.6 MG/DL (ref 8.4–10.2)
CHLORIDE SERPL-SCNC: 104 MMOL/L (ref 101–112)
CHOLEST SERPL-MCNC: 209 MG/DL (ref 150–200)
CO2 SERPL-SCNC: 29 MMOL/L (ref 22–30)
CREAT BLD-MCNC: 0.92 MG/DL (ref 0.7–1.3)
DEPRECATED RDW RBC AUTO: 44 FL (ref 37–54)
EOSINOPHIL # BLD AUTO: 0.39 10*3/MM3 (ref 0–0.4)
EOSINOPHIL NFR BLD AUTO: 5 % (ref 0.3–6.2)
ERYTHROCYTE [DISTWIDTH] IN BLOOD BY AUTOMATED COUNT: 13.4 % (ref 12.3–15.4)
GFR SERPL CREATININE-BSD FRML MDRD: 81 ML/MIN/1.73 (ref 42–98)
GLOBULIN UR ELPH-MCNC: 3.2 GM/DL (ref 2.3–3.5)
GLUCOSE BLD-MCNC: 107 MG/DL (ref 70–99)
HCT VFR BLD AUTO: 46.1 % (ref 37.5–51)
HDLC SERPL-MCNC: 62 MG/DL (ref 40–59)
HGB BLD-MCNC: 15.7 G/DL (ref 13–17.7)
LDLC SERPL CALC-MCNC: 130 MG/DL
LDLC/HDLC SERPL: 2.1 {RATIO} (ref 0–3.55)
LYMPHOCYTES # BLD AUTO: 1.96 10*3/MM3 (ref 0.7–3.1)
LYMPHOCYTES NFR BLD AUTO: 25.1 % (ref 19.6–45.3)
MCH RBC QN AUTO: 31.2 PG (ref 26.6–33)
MCHC RBC AUTO-ENTMCNC: 34.1 G/DL (ref 31.5–35.7)
MCV RBC AUTO: 91.5 FL (ref 79–97)
MONOCYTES # BLD AUTO: 0.61 10*3/MM3 (ref 0.1–0.9)
MONOCYTES NFR BLD AUTO: 7.8 % (ref 5–12)
NEUTROPHILS # BLD AUTO: 4.81 10*3/MM3 (ref 1.7–7)
NEUTROPHILS NFR BLD AUTO: 61.5 % (ref 42.7–76)
PLATELET # BLD AUTO: 313 10*3/MM3 (ref 140–450)
PMV BLD AUTO: 8.7 FL (ref 6–12)
POTASSIUM BLD-SCNC: 3.9 MMOL/L (ref 3.4–5)
PROT SERPL-MCNC: 7.3 G/DL (ref 6.3–8.6)
PSA SERPL-MCNC: 5.14 NG/ML (ref 0–4)
RBC # BLD AUTO: 5.04 10*6/MM3 (ref 4.14–5.8)
SODIUM BLD-SCNC: 142 MMOL/L (ref 137–145)
TRIGL SERPL-MCNC: 83 MG/DL
VLDLC SERPL-MCNC: 16.6 MG/DL
WBC NRBC COR # BLD: 7.82 10*3/MM3 (ref 3.4–10.8)

## 2019-06-06 PROCEDURE — G0103 PSA SCREENING: HCPCS | Performed by: INTERNAL MEDICINE

## 2019-06-06 PROCEDURE — 80053 COMPREHEN METABOLIC PANEL: CPT | Performed by: INTERNAL MEDICINE

## 2019-06-06 PROCEDURE — 36415 COLL VENOUS BLD VENIPUNCTURE: CPT | Performed by: INTERNAL MEDICINE

## 2019-06-06 PROCEDURE — 80061 LIPID PANEL: CPT | Performed by: INTERNAL MEDICINE

## 2019-06-06 PROCEDURE — 85025 COMPLETE CBC W/AUTO DIFF WBC: CPT | Performed by: INTERNAL MEDICINE

## 2019-06-10 NOTE — PROGRESS NOTES
Subjective        History of Present Illness     Arias Rodriguez is a 68 y.o. male who presents for annual follow up and review of labs to evaluate his chronic medical issues including hyperlipidemia, osteoarthritis, and diverticular disease.      He has a history of pH and mostly mild BPH symptoms, and we continue to follow PSA.  PSA has increased from 3.94 to 5.140 over the past year.  It was 4.4 the previous year.  BPH symptoms are still relatively mild.    Patient is due pneumonia vaccine.  He receives Prevnar today without difficulty or complication.    His blood pressure is well controlled today.  His weight is up 4 pounds in the past year.  He still remains physically active as a farmer.    He continues to use prescription strength naproxen sodium episodically for arthritic aches and pains.  We discussed how Tylenol arthritis can also be used for this purpose.  He denies any GI intolerance with his episodic use of the naproxen sodium.    He is up to date on colonoscopy.     The patient's relevant past medical, surgical, and social history was reviewed in Epic.   Lab results are reviewed with the patient today.  Fasting glucose 107. Normal renal and liver function.  Total cholesterol 209.  HDL 62.  . Triglycerides 83.  LDL/HDL ratio 2.10.     Review of Systems   Constitutional: Negative for chills, fatigue and fever.   HENT: Negative for congestion, ear pain, postnasal drip, sinus pressure and sore throat.    Respiratory: Negative for cough, shortness of breath and wheezing.    Cardiovascular: Negative for chest pain, palpitations and leg swelling.   Gastrointestinal: Negative for abdominal pain, blood in stool, constipation, diarrhea, nausea and vomiting.   Endocrine: Negative for cold intolerance, heat intolerance, polydipsia and polyuria.   Genitourinary: Negative for dysuria, frequency, hematuria and urgency.   Musculoskeletal: Positive for arthralgias.   Skin: Negative for rash.  "  Neurological: Negative for syncope and weakness.        Objective     /70 (BP Location: Left arm, Patient Position: Sitting, Cuff Size: Adult)   Pulse 66   Temp 98.2 °F (36.8 °C) (Temporal)   Ht 185.4 cm (73\")   Wt 84.8 kg (187 lb)   SpO2 95%   BMI 24.67 kg/m²     Physical Exam   Constitutional: He is oriented to person, place, and time. He appears well-developed and well-nourished. No distress.   HENT:   Head: Normocephalic and atraumatic.   Nose: Right sinus exhibits no maxillary sinus tenderness and no frontal sinus tenderness. Left sinus exhibits no maxillary sinus tenderness and no frontal sinus tenderness.   Mouth/Throat: Uvula is midline, oropharynx is clear and moist and mucous membranes are normal. No oral lesions. No tonsillar exudate.   Eyes: Conjunctivae and EOM are normal. Pupils are equal, round, and reactive to light.   Neck: Trachea normal. Neck supple. No JVD present. Carotid bruit is not present. No tracheal deviation present. No thyroid mass and no thyromegaly present.   Cardiovascular: Normal rate, regular rhythm, normal heart sounds and intact distal pulses.  No extrasystoles are present. PMI is not displaced.   No murmur heard.  Pulmonary/Chest: Effort normal and breath sounds normal. No accessory muscle usage. No respiratory distress. He has no decreased breath sounds. He has no wheezes. He has no rhonchi. He has no rales.   Abdominal: Soft. Bowel sounds are normal. He exhibits no distension. There is no hepatosplenomegaly. There is no tenderness.     Vascular Status -  His right foot exhibits normal foot vasculature  and no edema. His left foot exhibits normal foot vasculature  and no edema.  Lymphadenopathy:     He has no cervical adenopathy.   Neurological: He is alert and oriented to person, place, and time. No cranial nerve deficit. Coordination normal.   Skin: Skin is warm, dry and intact. No rash noted. No cyanosis. Nails show no clubbing.   Psychiatric: He has a normal " mood and affect. His speech is normal and behavior is normal. Judgment and thought content normal.   Vitals reviewed.          Assessment/Plan     Try to slightly improve diet by reducing cholesterol and carbohydrates.  Continue the good physical activity.  We will continue to monitor his glucose and cholesterol.    Continue the home rehab and home exercise program for his right rotator cuff partial tear.  Dr. Clinton in [place] recommended conservative management, and the patient has actually done quite well.    He does not feel he needs any medications for the mild BPH symptoms.  We will continue to follow his PSA annually.  If it continues to trend upward, we would refer to urology.    The patient receives Prevnar vaccine today without difficulty or complication.    Return to clinic in 12 months for annual checkup with fasting labs prior.    Diagnoses and all orders for this visit:    Mixed hyperlipidemia  -     CBC Auto Differential; Future  -     Comprehensive Metabolic Panel; Future  -     Lipid Panel; Future  -     TSH; Future    Elevated prostate specific antigen (PSA)    Rotator cuff syndrome, right    Benign prostatic hyperplasia with urinary hesitancy    Special screening for malignant neoplasm of prostate  -     PSA Screen; Future    IFG (impaired fasting glucose)  -     Hemoglobin A1c; Future    Other orders  -     naproxen (NAPROSYN) 375 MG tablet; Take 1 tablet by mouth 2 (Two) Times a Day With Meals.  -     Pneumococcal Conjugate Vaccine 13-Valent All        Lab on 06/06/2019   Component Date Value Ref Range Status   • WBC 06/06/2019 7.82  3.40 - 10.80 10*3/mm3 Final   • RBC 06/06/2019 5.04  4.14 - 5.80 10*6/mm3 Final   • Hemoglobin 06/06/2019 15.7  13.0 - 17.7 g/dL Final   • Hematocrit 06/06/2019 46.1  37.5 - 51.0 % Final   • MCV 06/06/2019 91.5  79.0 - 97.0 fL Final   • MCH 06/06/2019 31.2  26.6 - 33.0 pg Final   • MCHC 06/06/2019 34.1  31.5 - 35.7 g/dL Final   • RDW 06/06/2019 13.4  12.3 - 15.4  % Final   • RDW-SD 06/06/2019 44.0  37.0 - 54.0 fl Final   • MPV 06/06/2019 8.7  6.0 - 12.0 fL Final   • Platelets 06/06/2019 313  140 - 450 10*3/mm3 Final   • Neutrophil % 06/06/2019 61.5  42.7 - 76.0 % Final   • Lymphocyte % 06/06/2019 25.1  19.6 - 45.3 % Final   • Monocyte % 06/06/2019 7.8  5.0 - 12.0 % Final   • Eosinophil % 06/06/2019 5.0  0.3 - 6.2 % Final   • Basophil % 06/06/2019 0.6  0.0 - 1.5 % Final   • Neutrophils, Absolute 06/06/2019 4.81  1.70 - 7.00 10*3/mm3 Final   • Lymphocytes, Absolute 06/06/2019 1.96  0.70 - 3.10 10*3/mm3 Final   • Monocytes, Absolute 06/06/2019 0.61  0.10 - 0.90 10*3/mm3 Final   • Eosinophils, Absolute 06/06/2019 0.39  0.00 - 0.40 10*3/mm3 Final   • Basophils, Absolute 06/06/2019 0.05  0.00 - 0.20 10*3/mm3 Final   • Glucose 06/06/2019 107* 70 - 99 mg/dL Final   • BUN 06/06/2019 22  7 - 23 mg/dL Final   • Creatinine 06/06/2019 0.92  0.70 - 1.30 mg/dL Final   • Sodium 06/06/2019 142  137 - 145 mmol/L Final   • Potassium 06/06/2019 3.9  3.4 - 5.0 mmol/L Final   • Chloride 06/06/2019 104  101 - 112 mmol/L Final   • CO2 06/06/2019 29.0  22.0 - 30.0 mmol/L Final   • Calcium 06/06/2019 9.6  8.4 - 10.2 mg/dL Final   • Total Protein 06/06/2019 7.3  6.3 - 8.6 g/dL Final   • Albumin 06/06/2019 4.10  3.50 - 5.00 g/dL Final   • ALT (SGPT) 06/06/2019 15  <=50 U/L Final   • AST (SGOT) 06/06/2019 19  17 - 59 U/L Final   • Alkaline Phosphatase 06/06/2019 108  38 - 126 U/L Final   • Total Bilirubin 06/06/2019 0.9  0.2 - 1.3 mg/dL Final   • eGFR Non African Amer 06/06/2019 81  42 - 98 mL/min/1.73 Final   • Globulin 06/06/2019 3.2  2.3 - 3.5 gm/dL Final   • A/G Ratio 06/06/2019 1.3  1.1 - 1.8 g/dL Final   • BUN/Creatinine Ratio 06/06/2019 23.9  7.0 - 25.0 Final   • Anion Gap 06/06/2019 9.0  5.0 - 15.0 mmol/L Final   • Total Cholesterol 06/06/2019 209* 150 - 200 mg/dL Final   • Triglycerides 06/06/2019 83  <=150 mg/dL Final   • HDL Cholesterol 06/06/2019 62* 40 - 59 mg/dL Final   • LDL Cholesterol   06/06/2019 130* <=100 mg/dL Final   • VLDL Cholesterol 06/06/2019 16.6  mg/dL Final   • LDL/HDL Ratio 06/06/2019 2.10  0.00 - 3.55 Final   • PSA 06/06/2019 5.140* 0.000 - 4.000 ng/mL Final   ]

## 2019-06-13 ENCOUNTER — OFFICE VISIT (OUTPATIENT)
Dept: FAMILY MEDICINE CLINIC | Facility: CLINIC | Age: 69
End: 2019-06-13

## 2019-06-13 VITALS
HEART RATE: 66 BPM | SYSTOLIC BLOOD PRESSURE: 120 MMHG | BODY MASS INDEX: 24.78 KG/M2 | HEIGHT: 73 IN | DIASTOLIC BLOOD PRESSURE: 70 MMHG | WEIGHT: 187 LBS | OXYGEN SATURATION: 95 % | TEMPERATURE: 98.2 F

## 2019-06-13 DIAGNOSIS — R73.01 IFG (IMPAIRED FASTING GLUCOSE): Chronic | ICD-10-CM

## 2019-06-13 DIAGNOSIS — R39.11 BENIGN PROSTATIC HYPERPLASIA WITH URINARY HESITANCY: Chronic | ICD-10-CM

## 2019-06-13 DIAGNOSIS — M75.101 ROTATOR CUFF SYNDROME, RIGHT: Chronic | ICD-10-CM

## 2019-06-13 DIAGNOSIS — E78.2 MIXED HYPERLIPIDEMIA: Primary | Chronic | ICD-10-CM

## 2019-06-13 DIAGNOSIS — N40.1 BENIGN PROSTATIC HYPERPLASIA WITH URINARY HESITANCY: Chronic | ICD-10-CM

## 2019-06-13 DIAGNOSIS — Z12.5 SPECIAL SCREENING FOR MALIGNANT NEOPLASM OF PROSTATE: ICD-10-CM

## 2019-06-13 DIAGNOSIS — R97.20 ELEVATED PROSTATE SPECIFIC ANTIGEN (PSA): Chronic | ICD-10-CM

## 2019-06-13 PROCEDURE — 90670 PCV13 VACCINE IM: CPT | Performed by: INTERNAL MEDICINE

## 2019-06-13 PROCEDURE — 99214 OFFICE O/P EST MOD 30 MIN: CPT | Performed by: INTERNAL MEDICINE

## 2019-06-13 PROCEDURE — 90471 IMMUNIZATION ADMIN: CPT | Performed by: INTERNAL MEDICINE

## 2019-06-13 RX ORDER — NAPROXEN 375 MG/1
375 TABLET ORAL 2 TIMES DAILY WITH MEALS
Qty: 60 TABLET | Refills: 11 | Status: SHIPPED | OUTPATIENT
Start: 2019-06-13 | End: 2020-06-15

## 2020-06-08 ENCOUNTER — LAB (OUTPATIENT)
Dept: LAB | Facility: OTHER | Age: 70
End: 2020-06-08

## 2020-06-08 DIAGNOSIS — R73.01 IFG (IMPAIRED FASTING GLUCOSE): Chronic | ICD-10-CM

## 2020-06-08 DIAGNOSIS — Z12.5 SPECIAL SCREENING FOR MALIGNANT NEOPLASM OF PROSTATE: ICD-10-CM

## 2020-06-08 DIAGNOSIS — E78.2 MIXED HYPERLIPIDEMIA: Chronic | ICD-10-CM

## 2020-06-08 LAB
ALBUMIN SERPL-MCNC: 4.2 G/DL (ref 3.5–5)
ALBUMIN/GLOB SERPL: 1.5 G/DL (ref 1.1–1.8)
ALP SERPL-CCNC: 85 U/L (ref 38–126)
ALT SERPL W P-5'-P-CCNC: 14 U/L
ANION GAP SERPL CALCULATED.3IONS-SCNC: 4 MMOL/L (ref 5–15)
AST SERPL-CCNC: 22 U/L (ref 17–59)
BASOPHILS # BLD AUTO: 0.04 10*3/MM3 (ref 0–0.2)
BASOPHILS NFR BLD AUTO: 0.6 % (ref 0–1.5)
BILIRUB SERPL-MCNC: 0.9 MG/DL (ref 0.2–1.3)
BUN BLD-MCNC: 21 MG/DL (ref 7–23)
BUN/CREAT SERPL: 23.6 (ref 7–25)
CALCIUM SPEC-SCNC: 9.3 MG/DL (ref 8.4–10.2)
CHLORIDE SERPL-SCNC: 103 MMOL/L (ref 101–112)
CHOLEST SERPL-MCNC: 200 MG/DL (ref 150–200)
CO2 SERPL-SCNC: 32 MMOL/L (ref 22–30)
CREAT BLD-MCNC: 0.89 MG/DL (ref 0.7–1.3)
DEPRECATED RDW RBC AUTO: 42 FL (ref 37–54)
EOSINOPHIL # BLD AUTO: 0.2 10*3/MM3 (ref 0–0.4)
EOSINOPHIL NFR BLD AUTO: 2.9 % (ref 0.3–6.2)
ERYTHROCYTE [DISTWIDTH] IN BLOOD BY AUTOMATED COUNT: 12.8 % (ref 12.3–15.4)
GFR SERPL CREATININE-BSD FRML MDRD: 84 ML/MIN/1.73 (ref 42–98)
GLOBULIN UR ELPH-MCNC: 2.8 GM/DL (ref 2.3–3.5)
GLUCOSE BLD-MCNC: 113 MG/DL (ref 70–99)
HBA1C MFR BLD: 5.91 % (ref 4.8–5.6)
HCT VFR BLD AUTO: 45.5 % (ref 37.5–51)
HDLC SERPL-MCNC: 60 MG/DL (ref 40–59)
HGB BLD-MCNC: 15.5 G/DL (ref 13–17.7)
LDLC SERPL CALC-MCNC: 124 MG/DL
LDLC/HDLC SERPL: 2.07 {RATIO} (ref 0–3.55)
LYMPHOCYTES # BLD AUTO: 1.89 10*3/MM3 (ref 0.7–3.1)
LYMPHOCYTES NFR BLD AUTO: 27 % (ref 19.6–45.3)
MCH RBC QN AUTO: 31.2 PG (ref 26.6–33)
MCHC RBC AUTO-ENTMCNC: 34.1 G/DL (ref 31.5–35.7)
MCV RBC AUTO: 91.5 FL (ref 79–97)
MONOCYTES # BLD AUTO: 0.52 10*3/MM3 (ref 0.1–0.9)
MONOCYTES NFR BLD AUTO: 7.4 % (ref 5–12)
NEUTROPHILS # BLD AUTO: 4.35 10*3/MM3 (ref 1.7–7)
NEUTROPHILS NFR BLD AUTO: 62.1 % (ref 42.7–76)
PLATELET # BLD AUTO: 269 10*3/MM3 (ref 140–450)
PMV BLD AUTO: 9 FL (ref 6–12)
POTASSIUM BLD-SCNC: 3.9 MMOL/L (ref 3.4–5)
PROT SERPL-MCNC: 7 G/DL (ref 6.3–8.6)
PSA SERPL-MCNC: 4.36 NG/ML (ref 0–4)
RBC # BLD AUTO: 4.97 10*6/MM3 (ref 4.14–5.8)
SODIUM BLD-SCNC: 139 MMOL/L (ref 137–145)
TRIGL SERPL-MCNC: 79 MG/DL
TSH SERPL DL<=0.05 MIU/L-ACNC: 2.36 UIU/ML (ref 0.27–4.2)
VLDLC SERPL-MCNC: 15.8 MG/DL
WBC NRBC COR # BLD: 7 10*3/MM3 (ref 3.4–10.8)

## 2020-06-08 PROCEDURE — 80061 LIPID PANEL: CPT | Performed by: INTERNAL MEDICINE

## 2020-06-08 PROCEDURE — 36415 COLL VENOUS BLD VENIPUNCTURE: CPT | Performed by: INTERNAL MEDICINE

## 2020-06-08 PROCEDURE — 80053 COMPREHEN METABOLIC PANEL: CPT | Performed by: INTERNAL MEDICINE

## 2020-06-08 PROCEDURE — 85025 COMPLETE CBC W/AUTO DIFF WBC: CPT | Performed by: INTERNAL MEDICINE

## 2020-06-08 PROCEDURE — 83036 HEMOGLOBIN GLYCOSYLATED A1C: CPT | Performed by: INTERNAL MEDICINE

## 2020-06-08 PROCEDURE — 84443 ASSAY THYROID STIM HORMONE: CPT | Performed by: INTERNAL MEDICINE

## 2020-06-08 PROCEDURE — G0103 PSA SCREENING: HCPCS | Performed by: INTERNAL MEDICINE

## 2020-06-15 ENCOUNTER — OFFICE VISIT (OUTPATIENT)
Dept: FAMILY MEDICINE CLINIC | Facility: CLINIC | Age: 70
End: 2020-06-15

## 2020-06-15 VITALS — HEIGHT: 73 IN | WEIGHT: 185 LBS | BODY MASS INDEX: 24.52 KG/M2

## 2020-06-15 DIAGNOSIS — R73.01 IFG (IMPAIRED FASTING GLUCOSE): Chronic | ICD-10-CM

## 2020-06-15 DIAGNOSIS — Z00.00 MEDICARE ANNUAL WELLNESS VISIT, INITIAL: Primary | ICD-10-CM

## 2020-06-15 DIAGNOSIS — R97.20 ELEVATED PROSTATE SPECIFIC ANTIGEN (PSA): Chronic | ICD-10-CM

## 2020-06-15 DIAGNOSIS — E78.2 MIXED HYPERLIPIDEMIA: Chronic | ICD-10-CM

## 2020-06-15 DIAGNOSIS — N40.1 BENIGN PROSTATIC HYPERPLASIA WITH URINARY HESITANCY: Chronic | ICD-10-CM

## 2020-06-15 DIAGNOSIS — G47.9 SLEEP DISORDER: ICD-10-CM

## 2020-06-15 DIAGNOSIS — R39.11 BENIGN PROSTATIC HYPERPLASIA WITH URINARY HESITANCY: Chronic | ICD-10-CM

## 2020-06-15 DIAGNOSIS — Z12.11 COLON CANCER SCREENING: ICD-10-CM

## 2020-06-15 PROCEDURE — G0438 PPPS, INITIAL VISIT: HCPCS | Performed by: INTERNAL MEDICINE

## 2020-06-15 RX ORDER — NAPROXEN 375 MG/1
375 TABLET ORAL 2 TIMES DAILY WITH MEALS
Qty: 60 TABLET | Refills: 11 | Status: SHIPPED | OUTPATIENT
Start: 2020-06-15 | End: 2021-06-22 | Stop reason: SDUPTHER

## 2020-06-15 NOTE — PROGRESS NOTES
The ABCs of the Annual Wellness Visit  Initial Medicare Wellness Visit    Chief Complaint   Patient presents with   • Annual Exam   • Medicare Wellness-Initial Visit       Subjective   History of Present Illness:  Arias Rodriguez is a 69 y.o. male who presents for an Initial Medicare Wellness Visit.  You have chosen to receive care through a telephone visit. Do you consent to use a telephone visit for your medical care today? Yes  Total visit time: 22 min      HEALTH RISK ASSESSMENT    Recent Hospitalizations:  No hospitalization(s) within the last year.    Current Medical Providers:  Patient Care Team:  Peyman Denney MD as PCP - General (Internal Medicine)  John Lerma MD as PCP - Claims Attributed    Smoking Status:  Social History     Tobacco Use   Smoking Status Never Smoker   Smokeless Tobacco Never Used       Alcohol Consumption:  Social History     Substance and Sexual Activity   Alcohol Use No    Comment: Unknown       Depression Screen:   PHQ-2/PHQ-9 Depression Screening 6/15/2020   Little interest or pleasure in doing things 0   Feeling down, depressed, or hopeless 0   Trouble falling or staying asleep, or sleeping too much -   Feeling tired or having little energy -   Poor appetite or overeating -   Feeling bad about yourself - or that you are a failure or have let yourself or your family down -   Trouble concentrating on things, such as reading the newspaper or watching television -   Moving or speaking so slowly that other people could have noticed. Or the opposite - being so fidgety or restless that you have been moving around a lot more than usual -   Thoughts that you would be better off dead, or of hurting yourself in some way -   Total Score 0   If you checked off any problems, how difficult have these problems made it for you to do your work, take care of things at home, or get along with other people? -       Fall Risk Screen:  STEADI Fall Risk Assessment was completed, and patient  is at MODERATE risk for falls. Assessment completed on:6/15/2020    Health Habits and Functional and Cognitive Screening:  Functional & Cognitive Status 6/15/2020   Do you have difficulty preparing food and eating? No   Do you have difficulty bathing yourself, getting dressed or grooming yourself? No   Do you have difficulty using the toilet? No   Do you have difficulty moving around from place to place? No   Do you have trouble with steps or getting out of a bed or a chair? No   Current Diet Well Balanced Diet   Dental Exam Not up to date   Eye Exam Up to date   Exercise (times per week) 4 times per week   Current Exercise Activities Include Yard Work   Do you need help using the phone?  No   Are you deaf or do you have serious difficulty hearing?  No   Do you need help with transportation? No   Do you need help shopping? No   Do you need help preparing meals?  No   Do you need help with housework?  No   Do you need help with laundry? No   Do you need help taking your medications? No   Do you need help managing money? No   Do you ever drive or ride in a car without wearing a seat belt? Yes   Have you felt unusual stress, anger or loneliness in the last month? No   Who do you live with? Alone   If you need help, do you have trouble finding someone available to you? No   Have you been bothered in the last four weeks by sexual problems? No   Do you have difficulty concentrating, remembering or making decisions? No         Does the patient have evidence of cognitive impairment? No    Asprin use counseling:Does not need ASA (and currently is not on it)    Age-appropriate Screening Schedule:  Refer to the list below for future screening recommendations based on patient's age, sex and/or medical conditions. Orders for these recommended tests are listed in the plan section. The patient has been provided with a written plan.    Health Maintenance   Topic Date Due   • ZOSTER VACCINE (1 of 2) 09/28/1998   • COLONOSCOPY   06/17/2020   • INFLUENZA VACCINE  08/01/2020   • LIPID PANEL  06/08/2021   • TDAP/TD VACCINES (2 - Td) 05/31/2026          The following portions of the patient's history were reviewed and updated as appropriate:   He  has a past medical history of Benign prostatic hyperplasia with urinary hesitancy (6/13/2019), Diverticular disease of colon, Dysuria, Elevated prostate specific antigen (PSA), Encounter for immunization, Hyperlipidemia, IFG (impaired fasting glucose) (6/13/2019), Insect bite, Malaise and fatigue, Osteoarthritis of knee, Pes planus, Rotator cuff syndrome, right (6/11/2018), Screening for malignant neoplasm of colon, Screening for malignant neoplasm of prostate, Unexplained weight loss (6/11/2018), Unspecified disease of nail, and Urinary tract infectious disease.  He does not have any pertinent problems on file.  He  has a past surgical history that includes Colonoscopy (06/17/2015); Other surgical history (05/28/2015); and Knee surgery (Right).  His family history is not on file.  He  reports that he has never smoked. He has never used smokeless tobacco. He reports that he does not drink alcohol or use drugs.  Current Outpatient Medications   Medication Sig Dispense Refill   • naproxen (NAPROSYN) 375 MG tablet Take 1 tablet by mouth 2 (Two) Times a Day With Meals. 60 tablet 11     No current facility-administered medications for this visit.      Current Outpatient Medications on File Prior to Visit   Medication Sig   • [DISCONTINUED] naproxen (NAPROSYN) 375 MG tablet Take 1 tablet by mouth 2 (Two) Times a Day With Meals.     No current facility-administered medications on file prior to visit.      He has No Known Allergies..    Outpatient Medications Prior to Visit   Medication Sig Dispense Refill   • naproxen (NAPROSYN) 375 MG tablet Take 1 tablet by mouth 2 (Two) Times a Day With Meals. 60 tablet 11     No facility-administered medications prior to visit.        Patient Active Problem List  "  Diagnosis   • Screening for malignant neoplasm of prostate   • Hyperlipidemia   • Diverticular disease of colon   • Elevated prostate specific antigen (PSA)   • Rotator cuff syndrome, right   • Benign prostatic hyperplasia with urinary hesitancy   • IFG (impaired fasting glucose)       Advanced Care Planning:  ACP discussion was held with the patient during this visit. Patient does not have an advance directive, declines further assistance.    Review of Systems    Compared to one year ago, the patient feels his physical health is the same.  Compared to one year ago, the patient feels his mental health is the same.    Reviewed chart for potential of high risk medication in the elderly: yes  Reviewed chart for potential of harmful drug interactions in the elderly:yes    Objective         Vitals:    06/15/20 0936   Weight: 83.9 kg (185 lb)   Height: 185.4 cm (73\")   PainSc:   2   PainLoc: Generalized  Comment: knee pain       Body mass index is 24.41 kg/m².  Discussed the patient's BMI with him. The BMI is in the acceptable range.    Physical Exam    Lab Results   Component Value Date    TRIG 79 06/08/2020    HDL 60 (H) 06/08/2020     (H) 06/08/2020    VLDL 15.8 06/08/2020    HGBA1C 5.91 (H) 06/08/2020        Assessment/Plan   Medicare Risks and Personalized Health Plan  CMS Preventative Services Quick Reference  Advance Directive Discussion  Chronic Pain     The above risks/problems have been discussed with the patient.  Pertinent information has been shared with the patient in the After Visit Summary.  Follow up plans and orders are seen below in the Assessment/Plan Section.    Diagnoses and all orders for this visit:    1. Medicare annual wellness visit, initial (Primary)    2. IFG (impaired fasting glucose)  -     Hemoglobin A1c; Future    3. Mixed hyperlipidemia  -     CBC Auto Differential; Future  -     Comprehensive Metabolic Panel; Future  -     Lipid Panel; Future    4. Benign prostatic hyperplasia " with urinary hesitancy    5. Elevated prostate specific antigen (PSA)  -     PSA DIAGNOSTIC; Future    6. Sleep disorder    7. Colon cancer screening  -     Ambulatory Referral to General Surgery    Other orders  -     naproxen (NAPROSYN) 375 MG tablet; Take 1 tablet by mouth 2 (Two) Times a Day With Meals.  Dispense: 60 tablet; Refill: 11      Follow Up:  No follow-ups on file.     An After Visit Summary and PPPS were given to the patient.

## 2020-06-15 NOTE — PATIENT INSTRUCTIONS
Medicare Wellness  Personal Prevention Plan of Service     Date of Office Visit:  06/15/2020  Encounter Provider:  Peyman Denney MD  Place of Service:  [facility]  Patient Name: Arias Rodriguez  :  1950    As part of the Medicare Wellness portion of your visit today, we are providing you with this personalized preventive plan of services (PPPS). This plan is based upon recommendations of the [place] Preventive Services Task Force (USPSTF) and the Advisory Committee on Immunization Practices (ACIP).    This lists the preventive care services that should be considered, and provides dates of when you are due. Items listed as completed are up-to-date and do not require any further intervention.    Health Maintenance   Topic Date Due   • ZOSTER VACCINE (1 of 2) 1998   • Pneumococcal Vaccine Once at 65 Years Old  2013   • HEPATITIS C SCREENING  2017   • MEDICARE ANNUAL WELLNESS  2017   • COLONOSCOPY  2020   • INFLUENZA VACCINE  2020   • LIPID PANEL  2021   • TDAP/TD VACCINES (2 - Td) 2026       No orders of the defined types were placed in this encounter.      No follow-ups on file.

## 2020-06-15 NOTE — PROGRESS NOTES
Subjective      You have chosen to receive care through a telephone visit. Do you consent to use a telephone visit for your medical care today? Yes Total visit time: 22 minutes.      History of Present Illness     Arias MARLOW is a 68 y.o. male who presents for Initial Medicare wellness exam and annual follow up and review of labs to evaluate his chronic medical issues including hyperlipidemia, osteoarthritis, and diverticular disease.      He continues to use prescription strength naproxen sodium once daily about 6 days weekly for arthritic aches and pains.  We discussed how Tylenol can also be used for this purpose.  He denies any GI intolerance with his episodic use of the naproxen sodium.      He has a history of mild BPH symptoms, for which we continue to follow PSA.  PSA improved to 4.36, down from 5.14 one year ago. He reports he can sleep approximately 6 hours before having to get up due to nocturia as well as slow urinary stream, but feels he is managing symptoms adequately currently.   He is due repeat colonoscopy.  His last colonoscopy was completed by Dr. Downs 06/2015.       He describes a new problem of sleep difficulty episodically.  He takes occasional dose of Tylenol PM to help him sleep at night.  He has some nights it is difficult getting to sleep after taking a late nap when he comes in from working outside.   I discouraged frequent use of the Tylenol PM due to possibility of exacerbating his BPH symptoms.  I warned about the possibility of urinary retention.    He is due Pneumovax, which we will plan to give with his next in person visit.     The patient's relevant past medical, surgical, and social history was reviewed in Epic.   Lab results are reviewed with the patient today.  CBC unremarkable. Fasting glucose 113. Normal liver and renal function.  Total cholesterol 200.  HDL good at 60.  .  Triglycerides 79.  LDL/HDL ratio 2.07.         Review of Systems   Constitutional:  "Negative for chills, fatigue and fever.   HENT: Negative for congestion, ear pain, postnasal drip, sinus pressure and sore throat.    Respiratory: Negative for cough, shortness of breath and wheezing.    Cardiovascular: Negative for chest pain, palpitations and leg swelling.   Gastrointestinal: Negative for abdominal pain, blood in stool, constipation, diarrhea, nausea and vomiting.   Endocrine: Negative for cold intolerance, heat intolerance, polydipsia and polyuria.   Genitourinary: Negative for dysuria, frequency, hematuria and urgency.   Musculoskeletal: Positive for arthralgias.   Skin: Negative for rash.   Neurological: Negative for syncope and weakness.   Psychiatric/Behavioral: Positive for sleep disturbance.        Objective      Visit Vitals  Ht 185.4 cm (73\")   Wt 83.9 kg (185 lb)   BMI 24.41 kg/m²         Physical Exam       Assessment/Plan      Patient completes Initial Medicare wellness exam today.  He is due repeat colonoscopy, for which we will refer back to Dr. Hossein Downs.     We continue to follow the PSA, which is actually lower this year.  He reports mild BPH symptoms.    Decrease sugar and carbohydrates to help delay progression of impaired fasting glucose.  He continues to maintain a goal body weight with BMI 24.2.      Continue dietary efforts to manage hyperlipidemia, currently at goal.      He can continue the naproxen for the arthritic pain adding Tylenol when needed.      For the new problem of sleep difficulty, we reviewed appropriate sleep hygiene.  I informed patient of possible exacerbation of BPH symptoms and/or urinary retention with frequent use of the Tylenol PM, and advised against anything other than rare use.  He will sleep better at night if he can avoid the long later afternoon/early evening naps.        Return in one year for annual exam with fasting labs one week prior.  We will repeat annual Wellness subsequent exam.  We will plan on giving Pneumovax with his next in " person visit.      Scribed for Dr. Denney by Edda Costello [facility].     Diagnoses and all orders for this visit:    Medicare annual wellness visit, initial    IFG (impaired fasting glucose)  -     Hemoglobin A1c; Future    Mixed hyperlipidemia  -     CBC Auto Differential; Future  -     Comprehensive Metabolic Panel; Future  -     Lipid Panel; Future    Benign prostatic hyperplasia with urinary hesitancy    Elevated prostate specific antigen (PSA)  -     PSA DIAGNOSTIC; Future    Sleep disorder    Colon cancer screening  -     Ambulatory Referral to General Surgery    Other orders  -     naproxen (NAPROSYN) 375 MG tablet; Take 1 tablet by mouth 2 (Two) Times a Day With Meals.        Lab on 06/08/2020   Component Date Value Ref Range Status   • WBC 06/08/2020 7.00  3.40 - 10.80 10*3/mm3 Final   • RBC 06/08/2020 4.97  4.14 - 5.80 10*6/mm3 Final   • Hemoglobin 06/08/2020 15.5  13.0 - 17.7 g/dL Final   • Hematocrit 06/08/2020 45.5  37.5 - 51.0 % Final   • MCV 06/08/2020 91.5  79.0 - 97.0 fL Final   • MCH 06/08/2020 31.2  26.6 - 33.0 pg Final   • MCHC 06/08/2020 34.1  31.5 - 35.7 g/dL Final   • RDW 06/08/2020 12.8  12.3 - 15.4 % Final   • RDW-SD 06/08/2020 42.0  37.0 - 54.0 fl Final   • MPV 06/08/2020 9.0  6.0 - 12.0 fL Final   • Platelets 06/08/2020 269  140 - 450 10*3/mm3 Final   • Neutrophil % 06/08/2020 62.1  42.7 - 76.0 % Final   • Lymphocyte % 06/08/2020 27.0  19.6 - 45.3 % Final   • Monocyte % 06/08/2020 7.4  5.0 - 12.0 % Final   • Eosinophil % 06/08/2020 2.9  0.3 - 6.2 % Final   • Basophil % 06/08/2020 0.6  0.0 - 1.5 % Final   • Neutrophils, Absolute 06/08/2020 4.35  1.70 - 7.00 10*3/mm3 Final   • Lymphocytes, Absolute 06/08/2020 1.89  0.70 - 3.10 10*3/mm3 Final   • Monocytes, Absolute 06/08/2020 0.52  0.10 - 0.90 10*3/mm3 Final   • Eosinophils, Absolute 06/08/2020 0.20  0.00 - 0.40 10*3/mm3 Final   • Basophils, Absolute 06/08/2020 0.04  0.00 - 0.20 10*3/mm3 Final   • Glucose 06/08/2020 113* 70 - 99 mg/dL Final    • BUN 06/08/2020 21  7 - 23 mg/dL Final   • Creatinine 06/08/2020 0.89  0.70 - 1.30 mg/dL Final   • Sodium 06/08/2020 139  137 - 145 mmol/L Final   • Potassium 06/08/2020 3.9  3.4 - 5.0 mmol/L Final   • Chloride 06/08/2020 103  101 - 112 mmol/L Final   • CO2 06/08/2020 32.0* 22.0 - 30.0 mmol/L Final   • Calcium 06/08/2020 9.3  8.4 - 10.2 mg/dL Final   • Total Protein 06/08/2020 7.0  6.3 - 8.6 g/dL Final   • Albumin 06/08/2020 4.20  3.50 - 5.00 g/dL Final   • ALT (SGPT) 06/08/2020 14  <=50 U/L Final   • AST (SGOT) 06/08/2020 22  17 - 59 U/L Final   • Alkaline Phosphatase 06/08/2020 85  38 - 126 U/L Final   • Total Bilirubin 06/08/2020 0.9  0.2 - 1.3 mg/dL Final   • eGFR Non African Amer 06/08/2020 84  42 - 98 mL/min/1.73 Final   • Globulin 06/08/2020 2.8  2.3 - 3.5 gm/dL Final   • A/G Ratio 06/08/2020 1.5  1.1 - 1.8 g/dL Final   • BUN/Creatinine Ratio 06/08/2020 23.6  7.0 - 25.0 Final   • Anion Gap 06/08/2020 4.0* 5.0 - 15.0 mmol/L Final   • Hemoglobin A1C 06/08/2020 5.91* 4.80 - 5.60 % Final   • Total Cholesterol 06/08/2020 200  150 - 200 mg/dL Final   • Triglycerides 06/08/2020 79  <=150 mg/dL Final   • HDL Cholesterol 06/08/2020 60* 40 - 59 mg/dL Final   • LDL Cholesterol  06/08/2020 124* <=100 mg/dL Final   • VLDL Cholesterol 06/08/2020 15.8  mg/dL Final   • LDL/HDL Ratio 06/08/2020 2.07  0.00 - 3.55 Final   • TSH 06/08/2020 2.360  0.270 - 4.200 uIU/mL Final   • PSA 06/08/2020 4.360* 0.000 - 4.000 ng/mL Final   ]

## 2020-07-16 ENCOUNTER — PREP FOR SURGERY (OUTPATIENT)
Dept: OTHER | Facility: HOSPITAL | Age: 70
End: 2020-07-16

## 2020-07-16 DIAGNOSIS — Z12.11 SCREEN FOR COLON CANCER: Primary | ICD-10-CM

## 2020-07-16 RX ORDER — DEXTROSE AND SODIUM CHLORIDE 5; .45 G/100ML; G/100ML
100 INJECTION, SOLUTION INTRAVENOUS CONTINUOUS PRN
Status: CANCELLED | OUTPATIENT
Start: 2020-07-24

## 2020-07-21 PROCEDURE — U0003 INFECTIOUS AGENT DETECTION BY NUCLEIC ACID (DNA OR RNA); SEVERE ACUTE RESPIRATORY SYNDROME CORONAVIRUS 2 (SARS-COV-2) (CORONAVIRUS DISEASE [COVID-19]), AMPLIFIED PROBE TECHNIQUE, MAKING USE OF HIGH THROUGHPUT TECHNOLOGIES AS DESCRIBED BY CMS-2020-01-R: HCPCS | Performed by: SURGERY

## 2020-07-21 PROCEDURE — C9803 HOPD COVID-19 SPEC COLLECT: HCPCS | Performed by: SURGERY

## 2020-07-23 LAB
COVID LABCORP PRIORITY: NORMAL
SARS-COV-2 RNA RESP QL NAA+PROBE: NOT DETECTED

## 2020-07-24 ENCOUNTER — ANESTHESIA EVENT (OUTPATIENT)
Dept: GASTROENTEROLOGY | Facility: HOSPITAL | Age: 70
End: 2020-07-24

## 2020-07-24 ENCOUNTER — ANESTHESIA (OUTPATIENT)
Dept: GASTROENTEROLOGY | Facility: HOSPITAL | Age: 70
End: 2020-07-24

## 2020-07-24 ENCOUNTER — HOSPITAL ENCOUNTER (OUTPATIENT)
Facility: HOSPITAL | Age: 70
Setting detail: HOSPITAL OUTPATIENT SURGERY
Discharge: HOME | End: 2020-07-24
Attending: SURGERY | Admitting: SURGERY

## 2020-07-24 VITALS
WEIGHT: 185 LBS | HEART RATE: 63 BPM | TEMPERATURE: 97.2 F | SYSTOLIC BLOOD PRESSURE: 109 MMHG | OXYGEN SATURATION: 97 % | BODY MASS INDEX: 24.52 KG/M2 | RESPIRATION RATE: 18 BRPM | HEIGHT: 73 IN | DIASTOLIC BLOOD PRESSURE: 60 MMHG

## 2020-07-24 DIAGNOSIS — Z12.11 SCREEN FOR COLON CANCER: ICD-10-CM

## 2020-07-24 PROCEDURE — 25010000002 GLUCAGON (HUMAN RECOMBINANT) 1 MG RECONSTITUTED SOLUTION: Performed by: NURSE ANESTHETIST, CERTIFIED REGISTERED

## 2020-07-24 PROCEDURE — G0121 COLON CA SCRN NOT HI RSK IND: HCPCS | Performed by: SURGERY

## 2020-07-24 PROCEDURE — 25010000002 PROPOFOL 10 MG/ML EMULSION: Performed by: NURSE ANESTHETIST, CERTIFIED REGISTERED

## 2020-07-24 RX ORDER — ONDANSETRON 2 MG/ML
4 INJECTION INTRAMUSCULAR; INTRAVENOUS ONCE AS NEEDED
Status: DISCONTINUED | OUTPATIENT
Start: 2020-07-24 | End: 2020-07-24 | Stop reason: HOSPADM

## 2020-07-24 RX ORDER — DEXTROSE AND SODIUM CHLORIDE 5; .45 G/100ML; G/100ML
100 INJECTION, SOLUTION INTRAVENOUS CONTINUOUS PRN
Status: DISCONTINUED | OUTPATIENT
Start: 2020-07-24 | End: 2020-07-24 | Stop reason: HOSPADM

## 2020-07-24 RX ORDER — LIDOCAINE HYDROCHLORIDE 20 MG/ML
INJECTION, SOLUTION INTRAVENOUS AS NEEDED
Status: DISCONTINUED | OUTPATIENT
Start: 2020-07-24 | End: 2020-07-24 | Stop reason: SURG

## 2020-07-24 RX ORDER — PROPOFOL 10 MG/ML
VIAL (ML) INTRAVENOUS AS NEEDED
Status: DISCONTINUED | OUTPATIENT
Start: 2020-07-24 | End: 2020-07-24 | Stop reason: SURG

## 2020-07-24 RX ADMIN — PROPOFOL 20 MG: 10 INJECTION, EMULSION INTRAVENOUS at 10:01

## 2020-07-24 RX ADMIN — GLUCAGON HYDROCHLORIDE 0.5 MG: KIT at 10:05

## 2020-07-24 RX ADMIN — PROPOFOL 40 MG: 10 INJECTION, EMULSION INTRAVENOUS at 10:08

## 2020-07-24 RX ADMIN — PROPOFOL 20 MG: 10 INJECTION, EMULSION INTRAVENOUS at 10:10

## 2020-07-24 RX ADMIN — PROPOFOL 30 MG: 10 INJECTION, EMULSION INTRAVENOUS at 09:59

## 2020-07-24 RX ADMIN — PROPOFOL 20 MG: 10 INJECTION, EMULSION INTRAVENOUS at 10:13

## 2020-07-24 RX ADMIN — PROPOFOL 10 MG: 10 INJECTION, EMULSION INTRAVENOUS at 10:00

## 2020-07-24 RX ADMIN — PROPOFOL 20 MG: 10 INJECTION, EMULSION INTRAVENOUS at 10:16

## 2020-07-24 RX ADMIN — PROPOFOL 30 MG: 10 INJECTION, EMULSION INTRAVENOUS at 10:06

## 2020-07-24 RX ADMIN — PROPOFOL 10 MG: 10 INJECTION, EMULSION INTRAVENOUS at 09:58

## 2020-07-24 RX ADMIN — DEXTROSE AND SODIUM CHLORIDE 100 ML/HR: 5; 450 INJECTION, SOLUTION INTRAVENOUS at 09:15

## 2020-07-24 RX ADMIN — PROPOFOL 20 MG: 10 INJECTION, EMULSION INTRAVENOUS at 09:57

## 2020-07-24 RX ADMIN — PROPOFOL 20 MG: 10 INJECTION, EMULSION INTRAVENOUS at 10:19

## 2020-07-24 RX ADMIN — LIDOCAINE HYDROCHLORIDE 50 MG: 20 INJECTION, SOLUTION INTRAVENOUS at 09:56

## 2020-07-24 RX ADMIN — PROPOFOL 80 MG: 10 INJECTION, EMULSION INTRAVENOUS at 09:56

## 2020-07-24 NOTE — ANESTHESIA PREPROCEDURE EVALUATION
Anesthesia Evaluation     Patient summary reviewed and Nursing notes reviewed   NPO Solid Status: > 8 hours  NPO Liquid Status: > 4 hours           Airway   Mallampati: II  TM distance: >3 FB  Neck ROM: full  No difficulty expected  Dental - normal exam     Pulmonary - normal exam   Cardiovascular - normal exam    (+) hyperlipidemia,       Neuro/Psych  GI/Hepatic/Renal/Endo      ROS Comment: Diverticular disease    Musculoskeletal     Abdominal  - normal exam   Substance History      OB/GYN          Other                        Anesthesia Plan    ASA 2     MAC     intravenous induction     Anesthetic plan, all risks, benefits, and alternatives have been provided, discussed and informed consent has been obtained with: patient.

## 2020-07-24 NOTE — H&P
No chief complaint on file.      Arias Rodriguez is a 69 y.o. male referred today for evaluation for colonoscopy.  He notes no change in bowel habits, no blood in the stool.     Prior Colonoscopy:yes  Prior Polyps:no  Family History of Colon Cancer:no  On anticoagulation:no    Past Surgical History:   Procedure Laterality Date   • COLONOSCOPY  06/17/2015    Colonoscopy, diagnostic (screening) 67369 (1)      • KNEE SURGERY Right    • OTHER SURGICAL HISTORY  05/28/2015    DEBRIDE NAIL 6 OR MORE 02777 (1)        Past Medical History:   Diagnosis Date   • Benign prostatic hyperplasia with urinary hesitancy 6/13/2019   • Diverticular disease of colon     Moderate/severe per colonoscopy, Summer 2015      • Dysuria    • Elevated prostate specific antigen (PSA)     Resolved/vastly improved, 5/2016      • Encounter for immunization    • Hyperlipidemia    • IFG (impaired fasting glucose) 6/13/2019   • Insect bite     wound   • Malaise and fatigue    • Osteoarthritis of knee     Added Naprosyn as needed, 5/2016      • Pes planus    • Rotator cuff syndrome, right 6/11/2018   • Screening for malignant neoplasm of colon    • Screening for malignant neoplasm of prostate    • Unexplained weight loss 6/11/2018   • Unspecified disease of nail     Other specified diseases of nail      • Urinary tract infectious disease      Social History     Socioeconomic History   • Marital status: Single     Spouse name: Not on file   • Number of children: Not on file   • Years of education: Not on file   • Highest education level: Not on file   Tobacco Use   • Smoking status: Never Smoker   • Smokeless tobacco: Never Used   Substance and Sexual Activity   • Alcohol use: No     Comment: Unknown   • Drug use: No     History reviewed. No pertinent family history.  No Known Allergies    Home Medications:  Prior to Admission medications    Medication Sig Start Date End Date Taking? Authorizing Provider   naproxen (NAPROSYN) 375 MG tablet Take 1  tablet by mouth 2 (Two) Times a Day With Meals. 6/15/20  Yes Peyman Denney MD       Review of Systems   Constitutional: Negative.    HENT: Negative for hearing loss, nosebleeds and trouble swallowing.    Respiratory: Negative for apnea, chest tightness and shortness of breath.    Cardiovascular: Negative for chest pain and palpitations.   Gastrointestinal: Negative for abdominal distention, abdominal pain, blood in stool, constipation, diarrhea, nausea and vomiting.   Genitourinary: Negative for difficulty urinating, dysuria, frequency and urgency.   Musculoskeletal: Negative for back pain, joint swelling and neck pain.   Skin: Negative for rash.   Neurological: Negative for dizziness, seizures, weakness, light-headedness, numbness and headaches.   Hematological: Negative for adenopathy.   Psychiatric/Behavioral: Negative for agitation. The patient is not nervous/anxious.        Vitals:    07/24/20 0856   BP: 155/81   Pulse: 66   Resp: 18   Temp: 96.9 °F (36.1 °C)   SpO2: 97%       Physical Exam   Constitutional: He is oriented to person, place, and time. He appears well-developed and well-nourished. No distress.   HENT:   Head: Normocephalic and atraumatic.   Eyes: Conjunctivae and EOM are normal. No scleral icterus.   Neck: Normal range of motion. Neck supple. No tracheal deviation present. No thyromegaly present.   Cardiovascular: Normal rate, regular rhythm and normal heart sounds. Exam reveals no gallop and no friction rub.   No murmur heard.  Pulmonary/Chest: Effort normal and breath sounds normal. No stridor. No respiratory distress. He has no wheezes. He has no rales. He exhibits no tenderness.   Abdominal: Soft. Bowel sounds are normal. He exhibits no distension and no mass. There is no tenderness. There is no rebound and no guarding. No hernia.   Musculoskeletal: Normal range of motion. He exhibits no edema or deformity.   Lymphadenopathy:     He has no cervical adenopathy.   Neurological: He is alert and  oriented to person, place, and time.   Skin: Skin is warm and dry. No rash noted. No cyanosis or erythema. No pallor. Nails show no clubbing.   Psychiatric: He has a normal mood and affect. His behavior is normal. Thought content normal.       Assessment     In need of screening colonoscopy.    Plan     Risks, benefits, rationale and prep for colonoscopy have been discussed with the patient.  The patient indicates understanding of these issues and agrees with the plan.

## 2020-07-24 NOTE — ANESTHESIA POSTPROCEDURE EVALUATION
Patient: Arias Rodriguez    Procedure Summary     Date:  07/24/20 Room / Location:  [facility] ENDOSCOPY 2 / [facility] ENDOSCOPY    Anesthesia Start:  0946 Anesthesia Stop:  1024    Procedure:  COLONOSCOPY (N/A ) Diagnosis:       Screen for colon cancer      (Screen for colon cancer [Z12.11])    Surgeon:  Hossein Downs MD Provider:  Laisha Davis CRNA    Anesthesia Type:  MAC ASA Status:  2          Anesthesia Type: MAC    Vitals  No vitals data found for the desired time range.          Post Anesthesia Care and Evaluation    Patient location during evaluation: bedside  Level of consciousness: sleepy but conscious  Pain score: 0  Pain management: adequate  Airway patency: patent  Anesthetic complications: No anesthetic complications  PONV Status: none  Cardiovascular status: acceptable  Respiratory status: acceptable  Hydration status: acceptable

## 2020-07-31 ENCOUNTER — OFFICE VISIT (OUTPATIENT)
Dept: SURGERY | Facility: CLINIC | Age: 70
End: 2020-07-31

## 2020-07-31 DIAGNOSIS — K57.90 DIVERTICULAR DISEASE: Primary | ICD-10-CM

## 2020-07-31 PROCEDURE — 99212 OFFICE O/P EST SF 10 MIN: CPT | Performed by: SURGERY

## 2020-07-31 NOTE — PROGRESS NOTES
Spoke with patient about recent colonoscopy.  He had significant diverticulosis especially in his left colon.  1 area there may be 2 polyps and/or count of an ulcerated area right at the base of 1 of the polyps right at 40 cm in his colon.  I was unable to get the scope due to the bend in the colon to where to get the biopsy forceps to biopsy these area.  I went over this with the patient.  Recommend repeat his colonoscopy in about 6 months just to make sure this area is not changed.  It could be a polyp it could just be irritation or ulceration.  Patient agrees understands.  Also recommend use of fiber supplement on a daily basis for his diverticular disease.  My office will contact him in 6 months to set up his repeat colonoscopy.  He will follow-up with us sooner if he has any other concerns or questions

## 2021-06-15 ENCOUNTER — LAB (OUTPATIENT)
Dept: LAB | Facility: OTHER | Age: 71
End: 2021-06-15

## 2021-06-15 DIAGNOSIS — R73.01 IFG (IMPAIRED FASTING GLUCOSE): Chronic | ICD-10-CM

## 2021-06-15 DIAGNOSIS — E78.2 MIXED HYPERLIPIDEMIA: Chronic | ICD-10-CM

## 2021-06-15 DIAGNOSIS — R97.20 ELEVATED PROSTATE SPECIFIC ANTIGEN (PSA): Chronic | ICD-10-CM

## 2021-06-15 LAB
ALBUMIN SERPL-MCNC: 4.1 G/DL (ref 3.5–5)
ALBUMIN/GLOB SERPL: 1.3 G/DL (ref 1.1–1.8)
ALP SERPL-CCNC: 91 U/L (ref 38–126)
ALT SERPL W P-5'-P-CCNC: 18 U/L
ANION GAP SERPL CALCULATED.3IONS-SCNC: 4 MMOL/L (ref 5–15)
AST SERPL-CCNC: 24 U/L (ref 17–59)
BASOPHILS # BLD AUTO: 0.05 10*3/MM3 (ref 0–0.2)
BASOPHILS NFR BLD AUTO: 0.8 % (ref 0–1.5)
BILIRUB SERPL-MCNC: 0.9 MG/DL (ref 0.2–1.3)
BUN SERPL-MCNC: 23 MG/DL (ref 7–23)
BUN/CREAT SERPL: 28 (ref 7–25)
CALCIUM SPEC-SCNC: 9.5 MG/DL (ref 8.4–10.2)
CHLORIDE SERPL-SCNC: 108 MMOL/L (ref 101–112)
CHOLEST SERPL-MCNC: 198 MG/DL (ref 150–200)
CO2 SERPL-SCNC: 28 MMOL/L (ref 22–30)
CREAT SERPL-MCNC: 0.82 MG/DL (ref 0.7–1.3)
DEPRECATED RDW RBC AUTO: 45 FL (ref 37–54)
EOSINOPHIL # BLD AUTO: 0.26 10*3/MM3 (ref 0–0.4)
EOSINOPHIL NFR BLD AUTO: 4.1 % (ref 0.3–6.2)
ERYTHROCYTE [DISTWIDTH] IN BLOOD BY AUTOMATED COUNT: 13.6 % (ref 12.3–15.4)
GFR SERPL CREATININE-BSD FRML MDRD: 92 ML/MIN/1.73 (ref 42–98)
GLOBULIN UR ELPH-MCNC: 3.1 GM/DL (ref 2.3–3.5)
GLUCOSE SERPL-MCNC: 110 MG/DL (ref 70–99)
HCT VFR BLD AUTO: 45.3 % (ref 37.5–51)
HDLC SERPL-MCNC: 63 MG/DL (ref 40–59)
HGB BLD-MCNC: 15.4 G/DL (ref 13–17.7)
LDLC SERPL CALC-MCNC: 119 MG/DL
LDLC/HDLC SERPL: 1.86 {RATIO} (ref 0–3.55)
LYMPHOCYTES # BLD AUTO: 1.65 10*3/MM3 (ref 0.7–3.1)
LYMPHOCYTES NFR BLD AUTO: 25.9 % (ref 19.6–45.3)
MCH RBC QN AUTO: 31.4 PG (ref 26.6–33)
MCHC RBC AUTO-ENTMCNC: 34 G/DL (ref 31.5–35.7)
MCV RBC AUTO: 92.4 FL (ref 79–97)
MONOCYTES # BLD AUTO: 0.52 10*3/MM3 (ref 0.1–0.9)
MONOCYTES NFR BLD AUTO: 8.2 % (ref 5–12)
NEUTROPHILS NFR BLD AUTO: 3.9 10*3/MM3 (ref 1.7–7)
NEUTROPHILS NFR BLD AUTO: 61 % (ref 42.7–76)
PLATELET # BLD AUTO: 252 10*3/MM3 (ref 140–450)
PMV BLD AUTO: 9 FL (ref 6–12)
POTASSIUM SERPL-SCNC: 4 MMOL/L (ref 3.4–5)
PROT SERPL-MCNC: 7.2 G/DL (ref 6.3–8.6)
RBC # BLD AUTO: 4.9 10*6/MM3 (ref 4.14–5.8)
SODIUM SERPL-SCNC: 140 MMOL/L (ref 137–145)
TRIGL SERPL-MCNC: 88 MG/DL
VLDLC SERPL-MCNC: 16 MG/DL (ref 5–40)
WBC # BLD AUTO: 6.38 10*3/MM3 (ref 3.4–10.8)

## 2021-06-15 PROCEDURE — 36415 COLL VENOUS BLD VENIPUNCTURE: CPT | Performed by: INTERNAL MEDICINE

## 2021-06-15 PROCEDURE — 84153 ASSAY OF PSA TOTAL: CPT | Performed by: INTERNAL MEDICINE

## 2021-06-15 PROCEDURE — 80053 COMPREHEN METABOLIC PANEL: CPT | Performed by: INTERNAL MEDICINE

## 2021-06-15 PROCEDURE — 80061 LIPID PANEL: CPT | Performed by: INTERNAL MEDICINE

## 2021-06-15 PROCEDURE — 85025 COMPLETE CBC W/AUTO DIFF WBC: CPT | Performed by: INTERNAL MEDICINE

## 2021-06-15 PROCEDURE — 83036 HEMOGLOBIN GLYCOSYLATED A1C: CPT | Performed by: INTERNAL MEDICINE

## 2021-06-16 LAB
HBA1C MFR BLD: 5.6 % (ref 4.8–5.6)
PSA SERPL-MCNC: 4.23 NG/ML (ref 0–4)

## 2021-06-22 ENCOUNTER — OFFICE VISIT (OUTPATIENT)
Dept: FAMILY MEDICINE CLINIC | Facility: CLINIC | Age: 71
End: 2021-06-22

## 2021-06-22 VITALS
BODY MASS INDEX: 25.58 KG/M2 | WEIGHT: 193 LBS | SYSTOLIC BLOOD PRESSURE: 130 MMHG | TEMPERATURE: 96.4 F | HEIGHT: 73 IN | HEART RATE: 68 BPM | DIASTOLIC BLOOD PRESSURE: 80 MMHG

## 2021-06-22 DIAGNOSIS — Z23 NEED FOR VACCINATION AGAINST STREPTOCOCCUS PNEUMONIAE: ICD-10-CM

## 2021-06-22 DIAGNOSIS — R39.11 BENIGN PROSTATIC HYPERPLASIA WITH URINARY HESITANCY: Chronic | ICD-10-CM

## 2021-06-22 DIAGNOSIS — R73.01 IFG (IMPAIRED FASTING GLUCOSE): Chronic | ICD-10-CM

## 2021-06-22 DIAGNOSIS — Z12.5 SCREENING FOR MALIGNANT NEOPLASM OF PROSTATE: Chronic | ICD-10-CM

## 2021-06-22 DIAGNOSIS — N40.1 BENIGN PROSTATIC HYPERPLASIA WITH URINARY HESITANCY: Chronic | ICD-10-CM

## 2021-06-22 DIAGNOSIS — M19.042 OSTEOARTHRITIS OF FINGERS OF BOTH HANDS: Chronic | ICD-10-CM

## 2021-06-22 DIAGNOSIS — M19.041 OSTEOARTHRITIS OF FINGERS OF BOTH HANDS: Chronic | ICD-10-CM

## 2021-06-22 DIAGNOSIS — Z00.00 MEDICARE ANNUAL WELLNESS VISIT, SUBSEQUENT: Primary | ICD-10-CM

## 2021-06-22 DIAGNOSIS — E78.2 MIXED HYPERLIPIDEMIA: Chronic | ICD-10-CM

## 2021-06-22 DIAGNOSIS — R97.20 ELEVATED PROSTATE SPECIFIC ANTIGEN (PSA): Chronic | ICD-10-CM

## 2021-06-22 PROCEDURE — 99214 OFFICE O/P EST MOD 30 MIN: CPT | Performed by: INTERNAL MEDICINE

## 2021-06-22 PROCEDURE — 90732 PPSV23 VACC 2 YRS+ SUBQ/IM: CPT | Performed by: INTERNAL MEDICINE

## 2021-06-22 PROCEDURE — G0009 ADMIN PNEUMOCOCCAL VACCINE: HCPCS | Performed by: INTERNAL MEDICINE

## 2021-06-22 PROCEDURE — G0439 PPPS, SUBSEQ VISIT: HCPCS | Performed by: INTERNAL MEDICINE

## 2021-06-22 RX ORDER — SENNOSIDES 8.6 MG
650 CAPSULE ORAL EVERY 8 HOURS PRN
COMMUNITY

## 2021-06-22 RX ORDER — NAPROXEN 375 MG/1
375 TABLET ORAL 2 TIMES DAILY WITH MEALS
Qty: 60 TABLET | Refills: 11 | Status: SHIPPED | OUTPATIENT
Start: 2021-06-22 | End: 2022-07-21 | Stop reason: SDUPTHER

## 2021-06-22 NOTE — PROGRESS NOTES
The ABCs of the Annual Wellness Visit  Subsequent Medicare Wellness Visit    Chief Complaint   Patient presents with   • Annual Exam   • Medicare Wellness-subsequent       Subjective   History of Present Illness:  Arias Rodriguez is a 70 y.o. male who presents for a Subsequent Medicare Wellness Visit.    HEALTH RISK ASSESSMENT    Recent Hospitalizations:  No hospitalization(s) within the last year.    Current Medical Providers:  Patient Care Team:  Peyman Denney MD as PCP - General (Internal Medicine)    Smoking Status:  Social History     Tobacco Use   Smoking Status Never Smoker   Smokeless Tobacco Never Used       Alcohol Consumption:  Social History     Substance and Sexual Activity   Alcohol Use No    Comment: Unknown       Depression Screen:   PHQ-2/PHQ-9 Depression Screening 6/22/2021   Little interest or pleasure in doing things 0   Feeling down, depressed, or hopeless 0   Trouble falling or staying asleep, or sleeping too much -   Feeling tired or having little energy -   Poor appetite or overeating -   Feeling bad about yourself - or that you are a failure or have let yourself or your family down -   Trouble concentrating on things, such as reading the newspaper or watching television -   Moving or speaking so slowly that other people could have noticed. Or the opposite - being so fidgety or restless that you have been moving around a lot more than usual -   Thoughts that you would be better off dead, or of hurting yourself in some way -   Total Score 0   If you checked off any problems, how difficult have these problems made it for you to do your work, take care of things at home, or get along with other people? -       Fall Risk Screen:  STEADI Fall Risk Assessment was completed, and patient is at LOW risk for falls.Assessment completed on:6/22/2021    Health Habits and Functional and Cognitive Screening:  Functional & Cognitive Status 6/22/2021   Do you have difficulty preparing food and eating?  No   Do you have difficulty bathing yourself, getting dressed or grooming yourself? No   Do you have difficulty using the toilet? No   Do you have difficulty moving around from place to place? No   Do you have trouble with steps or getting out of a bed or a chair? No   Current Diet Limited Junk Food   Dental Exam Not up to date   Eye Exam Up to date   Exercise (times per week) 7 times per week   Current Exercises Include Yard Work        Exercise Comment farming   Current Exercise Activities Include -   Do you need help using the phone?  No   Are you deaf or do you have serious difficulty hearing?  No   Do you need help with transportation? No   Do you need help shopping? No   Do you need help preparing meals?  No   Do you need help with housework?  No   Do you need help with laundry? No   Do you need help taking your medications? No   Do you need help managing money? No   Do you ever drive or ride in a car without wearing a seat belt? Yes   Have you felt unusual stress, anger or loneliness in the last month? No   Who do you live with? Alone   If you need help, do you have trouble finding someone available to you? No   Have you been bothered in the last four weeks by sexual problems? No   Do you have difficulty concentrating, remembering or making decisions? Yes         Does the patient have evidence of cognitive impairment? No    Asprin use counseling:Does not need ASA (and currently is not on it)    Age-appropriate Screening Schedule:  Refer to the list below for future screening recommendations based on patient's age, sex and/or medical conditions. Orders for these recommended tests are listed in the plan section. The patient has been provided with a written plan.    Health Maintenance   Topic Date Due   • ZOSTER VACCINE (1 of 2) Never done   • INFLUENZA VACCINE  08/01/2021   • LIPID PANEL  06/15/2022   • TDAP/TD VACCINES (2 - Td or Tdap) 05/31/2026          The following portions of the patient's history were  "reviewed and updated as appropriate: allergies, current medications, past family history, past medical history, past social history, past surgical history and problem list.    Outpatient Medications Prior to Visit   Medication Sig Dispense Refill   • acetaminophen (TYLENOL) 650 MG 8 hr tablet Take 650 mg by mouth Every 8 (Eight) Hours As Needed for Mild Pain .     • naproxen (NAPROSYN) 375 MG tablet Take 1 tablet by mouth 2 (Two) Times a Day With Meals. 60 tablet 11     No facility-administered medications prior to visit.       Patient Active Problem List   Diagnosis   • Screening for malignant neoplasm of prostate   • Hyperlipidemia   • Diverticular disease of colon   • Elevated prostate specific antigen (PSA)   • Rotator cuff syndrome, right   • Benign prostatic hyperplasia with urinary hesitancy   • IFG (impaired fasting glucose)   • Screen for colon cancer   • Diverticular disease       Advanced Care Planning:  ACP discussion was held with the patient during this visit. Patient does not have an advance directive, information provided.    Review of Systems    Compared to one year ago, the patient feels his physical health is worse.  Compared to one year ago, the patient feels his mental health is the same.    Reviewed chart for potential of high risk medication in the elderly: yes  Reviewed chart for potential of harmful drug interactions in the elderly:yes    Objective         Vitals:    06/22/21 1101   BP: 130/80   Pulse: 68   Temp: 96.4 °F (35.8 °C)   TempSrc: Tympanic   Weight: 87.5 kg (193 lb)   Height: 185.4 cm (73\")   PainSc:   3   PainLoc: Knee  Comment: right       Body mass index is 25.46 kg/m².  Discussed the patient's BMI with him. The BMI is in the acceptable range.    Physical Exam    Lab Results   Component Value Date    TRIG 88 06/15/2021    HDL 63 (H) 06/15/2021     (H) 06/15/2021    VLDL 16 06/15/2021    HGBA1C 5.60 06/15/2021        Assessment/Plan   Medicare Risks and Personalized " Health Plan  CMS Preventative Services Quick Reference  Chronic Pain   Immunizations Discussed/Encouraged (specific immunizations; Pneumococcal 23 and Shingrix )    The above risks/problems have been discussed with the patient.  Patient receives Pneumovax vaccination today.  We also discussed Shingrix vaccination, and he will let us know if he desires it later this year.  Pertinent information has been shared with the patient in the After Visit Summary.  Follow up plans and orders are seen below in the Assessment/Plan Section.    Diagnoses and all orders for this visit:    1. Medicare annual wellness visit, subsequent (Primary)    2. Need for vaccination against Streptococcus pneumoniae    Other orders  -     naproxen (NAPROSYN) 375 MG tablet; Take 1 tablet by mouth 2 (Two) Times a Day With Meals.  Dispense: 60 tablet; Refill: 11  -     Pneumococcal Polysaccharide Vaccine 23-Valent Greater Than or Equal To 1yo Subcutaneous / IM      Follow Up:  No follow-ups on file.     An After Visit Summary and PPPS were given to the patient.

## 2021-06-22 NOTE — PROGRESS NOTES
"Chief Complaint  Annual Exam and Medicare Wellness-subsequent    Subjective          Arias Vidal Rodriguez presents to [facility]  History of Present Illness    Mr. Rodriguez is here for annual follow up of his chronic medical issues including hyperlipidemia, osteoarthritis, BPH and diverticular disease.   Last year, I tried to convince him to use Tylenol products more and naproxen sodium less for osteoarthritis aches and pains involving the knees and hands and some other joints.  He reports that he misunderstood or did not remember these instructions and has still been using 1 naproxen sodium 325 mg tablet approximately 6 days weekly, but only very rarely using any Tylenol products.  We reviewed our recommendations and the reasoning why we want him to avoid chronic use of NSAIDs.  He voices agreement and understanding.    He also receives subsequent Medicare wellness evaluation today.    I have reviewed all of his lab results with the patient today.  Cholesterol continues to be reasonable with dietary control.  PSA continues to be slightly elevated, but stable.    Objective   Vital Signs:   /80   Pulse 68   Temp 96.4 °F (35.8 °C) (Tympanic)   Ht 185.4 cm (73\")   Wt 87.5 kg (193 lb)   BMI 25.46 kg/m²     Physical Exam   Result Review :{Labs  Result Review  Imaging  Med Tab  Media  Procedures :23}     Common labs    Common Labsle 6/15/21 6/15/21 6/15/21 6/15/21 6/15/21    0730 0730 0730 0730 0730   Glucose  110 (A)      BUN  23      Creatinine  0.82      eGFR Non African Am  92      Sodium  140      Potassium  4.0      Chloride  108      Calcium  9.5      Albumin  4.10      Total Bilirubin  0.9      Alkaline Phosphatase  91      AST (SGOT)  24      ALT (SGPT)  18      WBC 6.38       Hemoglobin 15.4       Hematocrit 45.3       Platelets 252       Total Cholesterol   198     Triglycerides   88     HDL Cholesterol   63 (A)     LDL Cholesterol    119 (A)     Hemoglobin A1C     " 5.60   PSA    4.230 (A)    (A) Abnormal value                      Assessment and Plan    Diagnoses and all orders for this visit:    1. Medicare annual wellness visit, subsequent (Primary)    2. Need for vaccination against Streptococcus pneumoniae    3. IFG (impaired fasting glucose)  -     Comprehensive Metabolic Panel; Future  -     Hemoglobin A1c; Future    4. Mixed hyperlipidemia  -     CBC Auto Differential; Future  -     Comprehensive Metabolic Panel; Future  -     Lipid Panel; Future  -     TSH; Future    5. Elevated prostate specific antigen (PSA)    6. Benign prostatic hyperplasia with urinary hesitancy    7. Screening for malignant neoplasm of prostate  -     PSA Screen; Future    8. Osteoarthritis of fingers of both hands  -     Comprehensive Metabolic Panel; Future    Other orders  -     naproxen (NAPROSYN) 375 MG tablet; Take 1 tablet by mouth 2 (Two) Times a Day With Meals.  Dispense: 60 tablet; Refill: 11  -     Pneumococcal Polysaccharide Vaccine 23-Valent Greater Than or Equal To 3yo Subcutaneous / IM    Continue a healthy low-cholesterol low-sodium diet and continue the very good physical activity.  Try to maintain approximately the current body weight.  Continue to try to avoid concentrated sweets and follow a lower carbohydrate diet due to the IFG, which has not progressed.    I again reviewed recommendations that he try to reduce use of the prescription naproxen sodium somewhat, although he is only taking it once daily and is liver and renal functions are normal.  Start Tylenol arthritis 2 tablets in the a.m. and 2 tablets 8 hours later.  Add the naproxen sodium as needed on his worst days, with a goal of only taking it a couple times weekly on average.    Patient receives Pneumovax vaccination today without difficulty or complication.  We also recommend Shingrix vaccine and he can come in next month for the first dose, if he wishes.    I am pleased that the PSA is stable.  We will continue to  monitor this annually.    I spent 36 minutes caring for Arias on this date of service. This time includes time spent by me in the following activities:preparing for the visit, reviewing tests, obtaining and/or reviewing a separately obtained history, performing a medically appropriate examination and/or evaluation , counseling and educating the patient/family/caregiver, ordering medications, tests, or procedures, documenting information in the medical record and independently interpreting results and communicating that information with the patient/family/caregiver     Follow Up   Return in about 1 year (around 6/22/2022) for Annual physical, Next scheduled follow up - labs 1 week prior.  Patient was given instructions and counseling regarding his condition or for health maintenance advice. Please see specific information pulled into the AVS if appropriate.

## 2022-01-18 ENCOUNTER — TELEMEDICINE (OUTPATIENT)
Dept: FAMILY MEDICINE CLINIC | Facility: CLINIC | Age: 72
End: 2022-01-18

## 2022-01-18 VITALS — WEIGHT: 193 LBS | HEIGHT: 73 IN | BODY MASS INDEX: 25.58 KG/M2

## 2022-01-18 DIAGNOSIS — R53.81 MALAISE AND FATIGUE: ICD-10-CM

## 2022-01-18 DIAGNOSIS — M25.50 ARTHRALGIA, UNSPECIFIED JOINT: ICD-10-CM

## 2022-01-18 DIAGNOSIS — J06.9 UPPER RESPIRATORY TRACT INFECTION DUE TO COVID-19 VIRUS: Primary | ICD-10-CM

## 2022-01-18 DIAGNOSIS — U07.1 UPPER RESPIRATORY TRACT INFECTION DUE TO COVID-19 VIRUS: Primary | ICD-10-CM

## 2022-01-18 DIAGNOSIS — R53.83 MALAISE AND FATIGUE: ICD-10-CM

## 2022-01-18 PROCEDURE — G2025 DIS SITE TELE SVCS RHC/FQHC: HCPCS | Performed by: INTERNAL MEDICINE

## 2022-01-18 RX ORDER — LORATADINE 10 MG/1
10 TABLET ORAL DAILY
COMMUNITY

## 2022-01-18 RX ORDER — MOMETASONE FUROATE 50 UG/1
2 SPRAY, METERED NASAL DAILY
COMMUNITY

## 2022-01-18 NOTE — PROGRESS NOTES
"You have chosen to receive care through a telephone visit. Do you consent to use a telephone visit for your medical care today? Yes    Chief Complaint  Cough (Covid positive today at [facility]. APRN at [facility] called and was concerned regarding she doesnt believe he has anyone to help him. ), URI (head/chest. He was prescribed Claritin and Nasonex ), and Generalized Body Aches    Subjective          History of Present Illness     Arias Rodriguez (JACOBCorey) receives care to follow up on positive COVID test today at [facility] with Claritin and Nasonex prescribed. Patient had two dose of COVID vaccine last spring, but has not had his booster.  This 71-year-old gentleman lives alone and the APRN at [facility] was concerned about his care.  He started having nausea and clear rhinorrhea on Sunday with development of generalized body aches yesterday.  He has an occasional cough, but no difficulty breathing.  He does not have an O2 sat monitor. Nausea has been better today and he reports he has been eating and drinking okay today.       Objective   Vital Signs:   Ht 185.4 cm (73\")   Wt 87.5 kg (193 lb)   BMI 25.46 kg/m²       Physical Exam       Result Review :       Data reviewed: [facility] information       Future Appointments   Date Time Provider Department Center   6/29/2022 10:00 AM Peyman Denney MD [facility]        Assessment and Plan    Diagnoses and all orders for this visit:    1. Upper respiratory tract infection due to COVID-19 virus (Primary)    2. Malaise and fatigue    3. Arthralgia, unspecified joint         I spent 15 minutes caring for Arias on this date of service. This time includes time spent by me in the following activities:preparing for the visit, reviewing tests, obtaining and/or reviewing a separately obtained history, counseling and educating the patient/family/caregiver, ordering medications, tests, or procedures and documenting information in the medical record     Continue the Nasonex " and Claritin as prescribed at [facility]. Recommended OTC Tylenol for generalized body aches.  I recommended he borrow his brother's O2 sat monitor if he starts developing any difficulty breathing and go to the ER if O2 drops below 90%.  Plenty of rest and hydrate well.  Avoid exertional work on the farm. Recommended he follow a bland diet to help avoid nausea, gradually titrating to a regular diet.  Notify us if he feels symptoms are worsening of he develops evidence of bacterial infection.   Continue to isolate at home for the next 8-10 days. I recommended he have COVID booster in approximately 60-90 days.      Return 06/2022 for routine follow up with fasting labs one week prior or sooner if needed.     Follow Up   Return if symptoms worsen or fail to improve, for Next scheduled follow up.  Patient was given instructions and counseling regarding his condition or for health maintenance advice. Please see specific information pulled into the AVS if appropriate.

## 2022-05-20 ENCOUNTER — LAB (OUTPATIENT)
Dept: LAB | Facility: OTHER | Age: 72
End: 2022-05-20

## 2022-05-20 PROCEDURE — 87147 CULTURE TYPE IMMUNOLOGIC: CPT | Performed by: NURSE PRACTITIONER

## 2022-05-20 PROCEDURE — 87086 URINE CULTURE/COLONY COUNT: CPT | Performed by: NURSE PRACTITIONER

## 2022-06-22 ENCOUNTER — LAB (OUTPATIENT)
Dept: LAB | Facility: OTHER | Age: 72
End: 2022-06-22

## 2022-06-22 DIAGNOSIS — E78.2 MIXED HYPERLIPIDEMIA: Chronic | ICD-10-CM

## 2022-06-22 DIAGNOSIS — M19.042 OSTEOARTHRITIS OF FINGERS OF BOTH HANDS: Chronic | ICD-10-CM

## 2022-06-22 DIAGNOSIS — M19.041 OSTEOARTHRITIS OF FINGERS OF BOTH HANDS: Chronic | ICD-10-CM

## 2022-06-22 DIAGNOSIS — Z12.5 SCREENING FOR MALIGNANT NEOPLASM OF PROSTATE: Chronic | ICD-10-CM

## 2022-06-22 DIAGNOSIS — R73.01 IFG (IMPAIRED FASTING GLUCOSE): Chronic | ICD-10-CM

## 2022-06-22 LAB
ALBUMIN SERPL-MCNC: 4.2 G/DL (ref 3.5–5)
ALBUMIN/GLOB SERPL: 1.4 G/DL (ref 1.1–1.8)
ALP SERPL-CCNC: 86 U/L (ref 38–126)
ALT SERPL W P-5'-P-CCNC: 16 U/L
ANION GAP SERPL CALCULATED.3IONS-SCNC: 6 MMOL/L (ref 5–15)
AST SERPL-CCNC: 24 U/L (ref 17–59)
BASOPHILS # BLD AUTO: 0.04 10*3/MM3 (ref 0–0.2)
BASOPHILS NFR BLD AUTO: 0.5 % (ref 0–1.5)
BILIRUB SERPL-MCNC: 0.9 MG/DL (ref 0.2–1.3)
BUN SERPL-MCNC: 20 MG/DL (ref 7–23)
BUN/CREAT SERPL: 23.5 (ref 7–25)
CALCIUM SPEC-SCNC: 9.3 MG/DL (ref 8.4–10.2)
CHLORIDE SERPL-SCNC: 107 MMOL/L (ref 101–112)
CHOLEST SERPL-MCNC: 208 MG/DL (ref 150–200)
CO2 SERPL-SCNC: 28 MMOL/L (ref 22–30)
CREAT SERPL-MCNC: 0.85 MG/DL (ref 0.7–1.3)
DEPRECATED RDW RBC AUTO: 45.7 FL (ref 37–54)
EGFRCR SERPLBLD CKD-EPI 2021: 91.8 ML/MIN/1.73
EOSINOPHIL # BLD AUTO: 0.16 10*3/MM3 (ref 0–0.4)
EOSINOPHIL NFR BLD AUTO: 2.1 % (ref 0.3–6.2)
ERYTHROCYTE [DISTWIDTH] IN BLOOD BY AUTOMATED COUNT: 13.9 % (ref 12.3–15.4)
GLOBULIN UR ELPH-MCNC: 2.9 GM/DL (ref 2.3–3.5)
GLUCOSE SERPL-MCNC: 109 MG/DL (ref 70–99)
HBA1C MFR BLD: 5.8 % (ref 4.8–5.6)
HCT VFR BLD AUTO: 44.9 % (ref 37.5–51)
HDLC SERPL-MCNC: 74 MG/DL (ref 40–59)
HGB BLD-MCNC: 14.8 G/DL (ref 13–17.7)
LDLC SERPL CALC-MCNC: 121 MG/DL
LDLC/HDLC SERPL: 1.61 {RATIO} (ref 0–3.55)
LYMPHOCYTES # BLD AUTO: 1.93 10*3/MM3 (ref 0.7–3.1)
LYMPHOCYTES NFR BLD AUTO: 25.1 % (ref 19.6–45.3)
MCH RBC QN AUTO: 30 PG (ref 26.6–33)
MCHC RBC AUTO-ENTMCNC: 33 G/DL (ref 31.5–35.7)
MCV RBC AUTO: 90.9 FL (ref 79–97)
MONOCYTES # BLD AUTO: 0.64 10*3/MM3 (ref 0.1–0.9)
MONOCYTES NFR BLD AUTO: 8.3 % (ref 5–12)
NEUTROPHILS NFR BLD AUTO: 4.91 10*3/MM3 (ref 1.7–7)
NEUTROPHILS NFR BLD AUTO: 64 % (ref 42.7–76)
PLATELET # BLD AUTO: 261 10*3/MM3 (ref 140–450)
PMV BLD AUTO: 8.8 FL (ref 6–12)
POTASSIUM SERPL-SCNC: 4 MMOL/L (ref 3.4–5)
PROT SERPL-MCNC: 7.1 G/DL (ref 6.3–8.6)
PSA SERPL-MCNC: 4.44 NG/ML (ref 0–4)
RBC # BLD AUTO: 4.94 10*6/MM3 (ref 4.14–5.8)
SODIUM SERPL-SCNC: 141 MMOL/L (ref 137–145)
TRIGL SERPL-MCNC: 74 MG/DL
TSH SERPL DL<=0.05 MIU/L-ACNC: 1.96 UIU/ML (ref 0.27–4.2)
VLDLC SERPL-MCNC: 13 MG/DL (ref 5–40)
WBC NRBC COR # BLD: 7.68 10*3/MM3 (ref 3.4–10.8)

## 2022-06-22 PROCEDURE — 80053 COMPREHEN METABOLIC PANEL: CPT | Performed by: INTERNAL MEDICINE

## 2022-06-22 PROCEDURE — 85025 COMPLETE CBC W/AUTO DIFF WBC: CPT | Performed by: INTERNAL MEDICINE

## 2022-06-22 PROCEDURE — 36415 COLL VENOUS BLD VENIPUNCTURE: CPT | Performed by: INTERNAL MEDICINE

## 2022-06-22 PROCEDURE — 83036 HEMOGLOBIN GLYCOSYLATED A1C: CPT | Performed by: INTERNAL MEDICINE

## 2022-06-22 PROCEDURE — G0103 PSA SCREENING: HCPCS | Performed by: INTERNAL MEDICINE

## 2022-06-22 PROCEDURE — 80061 LIPID PANEL: CPT | Performed by: INTERNAL MEDICINE

## 2022-06-22 PROCEDURE — 84443 ASSAY THYROID STIM HORMONE: CPT | Performed by: INTERNAL MEDICINE

## 2022-06-29 ENCOUNTER — OFFICE VISIT (OUTPATIENT)
Dept: FAMILY MEDICINE CLINIC | Facility: CLINIC | Age: 72
End: 2022-06-29

## 2022-06-29 VITALS
BODY MASS INDEX: 24.81 KG/M2 | WEIGHT: 187.2 LBS | DIASTOLIC BLOOD PRESSURE: 80 MMHG | HEART RATE: 60 BPM | TEMPERATURE: 96.4 F | SYSTOLIC BLOOD PRESSURE: 138 MMHG | HEIGHT: 73 IN

## 2022-06-29 DIAGNOSIS — Z23 NEED FOR SHINGLES VACCINE: ICD-10-CM

## 2022-06-29 DIAGNOSIS — Z12.5 SCREENING FOR MALIGNANT NEOPLASM OF PROSTATE: ICD-10-CM

## 2022-06-29 DIAGNOSIS — E78.2 MIXED HYPERLIPIDEMIA: Chronic | ICD-10-CM

## 2022-06-29 DIAGNOSIS — R73.01 IFG (IMPAIRED FASTING GLUCOSE): Chronic | ICD-10-CM

## 2022-06-29 DIAGNOSIS — M17.11 PRIMARY OSTEOARTHRITIS OF RIGHT KNEE: Chronic | ICD-10-CM

## 2022-06-29 DIAGNOSIS — N40.1 BENIGN PROSTATIC HYPERPLASIA WITH URINARY HESITANCY: Chronic | ICD-10-CM

## 2022-06-29 DIAGNOSIS — R97.20 ELEVATED PROSTATE SPECIFIC ANTIGEN (PSA): Chronic | ICD-10-CM

## 2022-06-29 DIAGNOSIS — Z11.59 ENCOUNTER FOR HEPATITIS C SCREENING TEST FOR LOW RISK PATIENT: ICD-10-CM

## 2022-06-29 DIAGNOSIS — R39.11 BENIGN PROSTATIC HYPERPLASIA WITH URINARY HESITANCY: Chronic | ICD-10-CM

## 2022-06-29 DIAGNOSIS — Z00.00 MEDICARE ANNUAL WELLNESS VISIT, SUBSEQUENT: Primary | ICD-10-CM

## 2022-06-29 PROCEDURE — G0439 PPPS, SUBSEQ VISIT: HCPCS | Performed by: INTERNAL MEDICINE

## 2022-06-29 PROCEDURE — 90471 IMMUNIZATION ADMIN: CPT | Performed by: INTERNAL MEDICINE

## 2022-06-29 PROCEDURE — 1170F FXNL STATUS ASSESSED: CPT | Performed by: INTERNAL MEDICINE

## 2022-06-29 PROCEDURE — 1159F MED LIST DOCD IN RCRD: CPT | Performed by: INTERNAL MEDICINE

## 2022-06-29 PROCEDURE — 90750 HZV VACC RECOMBINANT IM: CPT | Performed by: INTERNAL MEDICINE

## 2022-06-29 PROCEDURE — 99214 OFFICE O/P EST MOD 30 MIN: CPT | Performed by: INTERNAL MEDICINE

## 2022-06-29 PROCEDURE — 1125F AMNT PAIN NOTED PAIN PRSNT: CPT | Performed by: INTERNAL MEDICINE

## 2022-06-29 NOTE — PROGRESS NOTES
The ABCs of the Annual Wellness Visit  Subsequent Medicare Wellness Visit    Chief Complaint   Patient presents with   • Annual Exam     He had UTI in May but feels fine now. He will be having cataract surgery next month   • Medicare Wellness-subsequent      Subjective    History of Present Illness:  Arias Rodriguez is a 71 y.o. male who presents for a Subsequent Medicare Wellness Visit.    The following portions of the patient's history were reviewed and   updated as appropriate: allergies, current medications, past family history, past medical history, past social history, past surgical history and problem list.    Compared to one year ago, the patient feels his physical   health is the same.    Compared to one year ago, the patient feels his mental   health is the same.    Recent Hospitalizations:  He was not admitted to the hospital during the last year.       Current Medical Providers:  Patient Care Team:  Peyman Denney MD as PCP - General (Internal Medicine)    Outpatient Medications Prior to Visit   Medication Sig Dispense Refill   • acetaminophen (TYLENOL) 650 MG 8 hr tablet Take 650 mg by mouth Every 8 (Eight) Hours As Needed for Mild Pain .     • loratadine (CLARITIN) 10 MG tablet Take 10 mg by mouth Daily.     • mometasone (NASONEX) 50 MCG/ACT nasal spray 2 sprays into the nostril(s) as directed by provider Daily.     • naproxen (NAPROSYN) 375 MG tablet Take 1 tablet by mouth 2 (Two) Times a Day With Meals. 60 tablet 11     No facility-administered medications prior to visit.       No opioid medication identified on active medication list. I have reviewed chart for other potential  high risk medication/s and harmful drug interactions in the elderly.          Aspirin is not on active medication list.  Aspirin use is not indicated based on review of current medical condition/s. Risk of harm outweighs potential benefits.  .    Patient Active Problem List   Diagnosis   • Screening for malignant neoplasm  "of prostate   • Hyperlipidemia   • Diverticular disease of colon   • Elevated prostate specific antigen (PSA)   • Rotator cuff syndrome, right   • Benign prostatic hyperplasia with urinary hesitancy   • IFG (impaired fasting glucose)   • Screen for colon cancer   • Diverticular disease   • Osteoarthritis of fingers of both hands   • Primary osteoarthritis of right knee     Advance Care Planning  Advance Directive is not on file.  ACP discussion was held with the patient during this visit. Patient does not have an advance directive, information provided.          Objective    Vitals:    06/29/22 0951   BP: 138/80   Pulse: 60   Temp: 96.4 °F (35.8 °C)   TempSrc: Tympanic   Weight: 84.9 kg (187 lb 3.2 oz)   Height: 185.4 cm (73\")   PainSc:   3   PainLoc: Generalized  Comment: arthritis     Estimated body mass index is 24.7 kg/m² as calculated from the following:    Height as of this encounter: 185.4 cm (73\").    Weight as of this encounter: 84.9 kg (187 lb 3.2 oz).    BMI is within normal parameters. No other follow-up for BMI required.      Does the patient have evidence of cognitive impairment? No    Physical Exam  Lab Results   Component Value Date    TRIG 74 06/22/2022    HDL 74 (H) 06/22/2022     (H) 06/22/2022    VLDL 13 06/22/2022    HGBA1C 5.80 (H) 06/22/2022            HEALTH RISK ASSESSMENT    Smoking Status:  Social History     Tobacco Use   Smoking Status Never Smoker   Smokeless Tobacco Never Used     Alcohol Consumption:  Social History     Substance and Sexual Activity   Alcohol Use No    Comment: Unknown     Fall Risk Screen:    STEADI Fall Risk Assessment was completed, and patient is at LOW risk for falls.Assessment completed on:6/29/2022    Depression Screening:  PHQ-2/PHQ-9 Depression Screening 6/29/2022   Retired PHQ-9 Total Score -   Retired Total Score -   Little Interest or Pleasure in Doing Things 0-->not at all   Feeling Down, Depressed or Hopeless 0-->not at all   PHQ-9: Brief " Depression Severity Measure Score 0       Health Habits and Functional and Cognitive Screening:  Functional & Cognitive Status 6/29/2022   Do you have difficulty preparing food and eating? No   Do you have difficulty bathing yourself, getting dressed or grooming yourself? No   Do you have difficulty using the toilet? No   Do you have difficulty moving around from place to place? No   Do you have trouble with steps or getting out of a bed or a chair? Yes   Current Diet Frequent Junk Food   Dental Exam Not up to date   Eye Exam Up to date   Exercise (times per week) 6 times per week   Current Exercises Include Yard Work        Exercise Comment farming   Current Exercise Activities Include -   Do you need help using the phone?  No   Are you deaf or do you have serious difficulty hearing?  Yes   Do you need help with transportation? No   Do you need help shopping? No   Do you need help preparing meals?  No   Do you need help with housework?  No   Do you need help with laundry? No   Do you need help taking your medications? No   Do you need help managing money? No   Do you ever drive or ride in a car without wearing a seat belt? Yes   Have you felt unusual stress, anger or loneliness in the last month? No   Who do you live with? Alone   If you need help, do you have trouble finding someone available to you? No   Have you been bothered in the last four weeks by sexual problems? No   Do you have difficulty concentrating, remembering or making decisions? Yes       Age-appropriate Screening Schedule:  Refer to the list below for future screening recommendations based on patient's age, sex and/or medical conditions. Orders for these recommended tests are listed in the plan section. The patient has been provided with a written plan.    Health Maintenance   Topic Date Due   • ZOSTER VACCINE (1 of 2) Never done   • INFLUENZA VACCINE  10/01/2022   • LIPID PANEL  06/22/2023   • TDAP/TD VACCINES (2 - Td or Tdap) 05/31/2026               Assessment & Plan   CMS Preventative Services Quick Reference  Risk Factors Identified During Encounter  Chronic Pain   Immunizations Discussed/Encouraged (specific Immunizations; Shingrix and COVID19  The above risks/problems have been discussed with the patient.  Follow up actions/plans if indicated are seen below in the Assessment/Plan Section.  Pertinent information has been shared with the patient in the After Visit Summary.    Diagnoses and all orders for this visit:    1. Medicare annual wellness visit, subsequent (Primary)    2. IFG (impaired fasting glucose)  -     Comprehensive Metabolic Panel; Future  -     Hemoglobin A1c; Future    3. Mixed hyperlipidemia  -     CBC Auto Differential; Future  -     Comprehensive Metabolic Panel; Future  -     Lipid Panel; Future  -     TSH; Future    4. Primary osteoarthritis of right knee    5. Elevated prostate specific antigen (PSA)    6. Encounter for hepatitis C screening test for low risk patient  -     Hepatitis C Antibody; Future    7. Screening for malignant neoplasm of prostate  -     PSA Screen; Future    8. Benign prostatic hyperplasia with urinary hesitancy    Other orders  -     Zoster Vac Recomb Adjuvanted 50 MCG/0.5ML reconstituted suspension; Inject 0.5 mL into the appropriate muscle as directed by prescriber Every 2 (Two) Months.  Dispense: 1 each; Refill: 1        Follow Up:   Return in about 1 year (around 6/29/2023) for Next scheduled follow up.     An After Visit Summary and PPPS were made available to the patient.          I spent 6 minutes caring for [place] on this date of service. This time includes time spent by me in the following activities:preparing for the visit, performing a medically appropriate examination and/or evaluation , counseling and educating the patient/family/caregiver and documenting information in the medical record

## 2022-06-29 NOTE — PROGRESS NOTES
"Chief Complaint  Annual Exam (He had UTI in May but feels fine now. He will be having cataract surgery next month) and Medicare Wellness-subsequent    Subjective        History of Present Illness     Arias Drake  Rodriguez presents to the office for annual follow up of his chronic medical issues including hyperlipidemia, osteoarthritis, BPH and diverticular disease.  He takes Tylenol and limits naproxen to three days weekly for osteoarthritis, mainly in the knees.  Episodic steroid injections have helped manage pain.      He also completes subsequent Medicare Wellness exam while in the office. He has had 2 doses of COVID vaccine, but has not had a booster dose. Patient tested positive for COVID 01/2022.   Shingrix is available and given in the office today.  Otherwise, patient is up to date on labs and cancer screening.       Weight is down 6 pounds in the past year.  BP and HR at goal.     The patient's relevant past medical, surgical, and social history was reviewed in Epic.   Lab results are reviewed with the patient today.  CBC unremarkable. Fasting glucose 109.  A1c 5.8.  Normal thyroid screen.  Total cholesterol slightly above goal at 208 and LDL above goal at 121, although, his excellent HDL of 74 gives him a favorable cholesterol ratio 1.61.  PSA relatively stable at 4.4.         Objective   Vital Signs:  /80   Pulse 60   Temp 96.4 °F (35.8 °C) (Tympanic)   Ht 185.4 cm (73\")   Wt 84.9 kg (187 lb 3.2 oz)   BMI 24.70 kg/m²   Estimated body mass index is 24.7 kg/m² as calculated from the following:    Height as of this encounter: 185.4 cm (73\").    Weight as of this encounter: 84.9 kg (187 lb 3.2 oz).    BMI is within normal parameters. No other follow-up for BMI required.      Physical Exam  Vitals reviewed.   Constitutional:       General: He is not in acute distress.     Appearance: He is well-developed.      Comments: Pleasant male.    HENT:      Head: Normocephalic and atraumatic.      " Nose:      Right Sinus: No maxillary sinus tenderness or frontal sinus tenderness.      Left Sinus: No maxillary sinus tenderness or frontal sinus tenderness.      Mouth/Throat:      Mouth: No oral lesions.      Pharynx: Uvula midline.      Tonsils: No tonsillar exudate.   Eyes:      Conjunctiva/sclera: Conjunctivae normal.      Pupils: Pupils are equal, round, and reactive to light.   Neck:      Thyroid: No thyroid mass or thyromegaly.      Vascular: No carotid bruit or JVD.      Trachea: Trachea normal. No tracheal deviation.   Cardiovascular:      Rate and Rhythm: Normal rate and regular rhythm.  No extrasystoles are present.     Chest Wall: PMI is not displaced.      Heart sounds: Normal heart sounds. No murmur heard.  Pulmonary:      Effort: Pulmonary effort is normal. No accessory muscle usage or respiratory distress.      Breath sounds: Normal breath sounds. No decreased breath sounds, wheezing, rhonchi or rales.   Abdominal:      General: Bowel sounds are normal. There is no distension.      Palpations: Abdomen is soft.      Tenderness: There is no abdominal tenderness.   Musculoskeletal:      Cervical back: Neck supple.      Comments: Prominent arthritic changes of the bilateral hands and knees   Lymphadenopathy:      Cervical: No cervical adenopathy.   Skin:     General: Skin is warm and dry.      Findings: No rash.      Nails: There is no clubbing.   Neurological:      General: No focal deficit present.      Mental Status: He is alert and oriented to person, place, and time. Mental status is at baseline.      Cranial Nerves: No cranial nerve deficit.      Coordination: Coordination normal.   Psychiatric:         Mood and Affect: Mood normal.         Speech: Speech normal.         Behavior: Behavior normal.         Thought Content: Thought content normal.         Judgment: Judgment normal.            Result Review :    CMP    CMP 6/22/22   Glucose 109 (A)   BUN 20   Creatinine 0.85   Sodium 141   Potassium  4.0   Chloride 107   Calcium 9.3   Albumin 4.20   Total Bilirubin 0.9   Alkaline Phosphatase 86   AST (SGOT) 24   ALT (SGPT) 16   (A) Abnormal value            CBC w/diff    CBC w/Diff 6/22/22   WBC 7.68   RBC 4.94   Hemoglobin 14.8   Hematocrit 44.9   MCV 90.9   MCH 30.0   MCHC 33.0   RDW 13.9   Platelets 261   Neutrophil Rel % 64.0   Lymphocyte Rel % 25.1   Monocyte Rel % 8.3   Eosinophil Rel % 2.1   Basophil Rel % 0.5           Lipid Panel    Lipid Panel 6/22/22   Total Cholesterol 208 (A)   Triglycerides 74   HDL Cholesterol 74 (A)   VLDL Cholesterol 13   LDL Cholesterol  121 (A)   LDL/HDL Ratio 1.61   (A) Abnormal value            TSH    TSH 6/22/22   TSH 1.960           A1C Last 3 Results    HGBA1C Last 3 Results 6/22/22   Hemoglobin A1C 5.80 (A)   (A) Abnormal value            PSA    PSA 6/22/22   PSA 4.440 (A)   (A) Abnormal value            Data reviewed: Radiologic studies knee x-rays          Assessment and Plan   Diagnoses and all orders for this visit:    1. Medicare annual wellness visit, subsequent (Primary)    2. IFG (impaired fasting glucose)  -     Comprehensive Metabolic Panel; Future  -     Hemoglobin A1c; Future    3. Mixed hyperlipidemia  -     CBC Auto Differential; Future  -     Comprehensive Metabolic Panel; Future  -     Lipid Panel; Future  -     TSH; Future    4. Primary osteoarthritis of right knee    5. Elevated prostate specific antigen (PSA)    6. Encounter for hepatitis C screening test for low risk patient  -     Hepatitis C Antibody; Future    7. Screening for malignant neoplasm of prostate  -     PSA Screen; Future    8. Benign prostatic hyperplasia with urinary hesitancy    Other orders  -     Zoster Vac Recomb Adjuvanted 50 MCG/0.5ML reconstituted suspension; Inject 0.5 mL into the appropriate muscle as directed by prescriber Every 2 (Two) Months.  Dispense: 1 each; Refill: 1           I spent 32 minutes caring for [place] on this date of service. This time includes time spent by  me in the following activities:preparing for the visit, reviewing tests, obtaining and/or reviewing a separately obtained history, performing a medically appropriate examination and/or evaluation , counseling and educating the patient/family/caregiver, ordering medications, tests, or procedures and documenting information in the medical record.  This does not include time spent on his Medicare AWV today    Patient completes Subsequent Medicare wellness exam.   Prescription is sent to [facility] for Shingrix.  He brings the 1st dose to the office to be administered.  After informed verbal consent, patient is given 1st dose of Shingrix.  He tolerated well. He will be due 2nd booster in 2 to 6 months. He is up to date on cancer screenings and labs.       We will continue to monitor PSA, which is relatively stable at 4.4.       Continue Tylenol for osteoarthritis of the knees and hands.  He is limiting naproxen to three days weekly.  I offered referral to orthopedist for evaluation of knee pain.  He will notify us if he decides he wants a referral.        Continue diet and exercise to delay progression of impaired fasting glucose and manage cholesterol.  His excellent HDL gives patient a favorable cholesterol ratio.      Return in 12 months for annual follow up with fasting labs one week prior or sooner if needed.       Scribed for Dr. Denney by Edda Costello [facility].        Follow Up   Return in about 1 year (around 6/29/2023) for Next scheduled follow up.  Patient was given instructions and counseling regarding his condition or for health maintenance advice. Please see specific information pulled into the AVS if appropriate.

## 2022-06-29 NOTE — PATIENT INSTRUCTIONS
Medicare Wellness  Personal Prevention Plan of Service     Date of Office Visit:    Encounter Provider:  Peyman Denney MD  Place of Service:  [facility]  Patient Name: Arias Rodriguez  :  1950    As part of the Medicare Wellness portion of your visit today, we are providing you with this personalized preventive plan of services (PPPS). This plan is based upon recommendations of the [place] Preventive Services Task Force (USPSTF) and the Advisory Committee on Immunization Practices (ACIP).    This lists the preventive care services that should be considered, and provides dates of when you are due. Items listed as completed are up-to-date and do not require any further intervention.    Health Maintenance   Topic Date Due    ZOSTER VACCINE (1 of 2) Never done    HEPATITIS C SCREENING  Never done    COVID-19 Vaccine (3 - Booster) 2021    ANNUAL WELLNESS VISIT  2022    INFLUENZA VACCINE  10/01/2022    LIPID PANEL  2023    COLORECTAL CANCER SCREENING  2025    TDAP/TD VACCINES (2 - Td or Tdap) 2026    Pneumococcal Vaccine 65+  Completed       No orders of the defined types were placed in this encounter.      No follow-ups on file.

## 2022-07-21 DIAGNOSIS — M17.11 PRIMARY OSTEOARTHRITIS OF RIGHT KNEE: Primary | ICD-10-CM

## 2022-07-21 RX ORDER — NAPROXEN 375 MG/1
375 TABLET ORAL 2 TIMES DAILY WITH MEALS
Qty: 60 TABLET | Refills: 11 | Status: SHIPPED | OUTPATIENT
Start: 2022-07-21

## (undated) DEVICE — CANN SMPL SOFTECH BIFLO ETCO2 A/M 7FT

## (undated) DEVICE — SINGLE-USE BIOPSY FORCEPS: Brand: RADIAL JAW 4